# Patient Record
Sex: FEMALE | Race: BLACK OR AFRICAN AMERICAN | NOT HISPANIC OR LATINO | Employment: UNEMPLOYED | URBAN - METROPOLITAN AREA
[De-identification: names, ages, dates, MRNs, and addresses within clinical notes are randomized per-mention and may not be internally consistent; named-entity substitution may affect disease eponyms.]

---

## 2018-05-04 ENCOUNTER — HOSPITAL ENCOUNTER (OUTPATIENT)
Dept: RADIOLOGY | Facility: HOSPITAL | Age: 48
Discharge: HOME | End: 2018-05-04
Attending: FAMILY MEDICINE
Payer: MEDICAID

## 2018-05-04 VITALS — BODY MASS INDEX: 28.35 KG/M2 | HEIGHT: 63 IN | WEIGHT: 160 LBS

## 2018-05-04 DIAGNOSIS — Z12.31 SCREENING MAMMOGRAM, ENCOUNTER FOR: ICD-10-CM

## 2018-05-04 PROCEDURE — 77063 BREAST TOMOSYNTHESIS BI: CPT | Mod: 26,,, | Performed by: RADIOLOGY

## 2018-05-04 PROCEDURE — 77067 SCR MAMMO BI INCL CAD: CPT | Mod: TC,PO

## 2018-05-04 PROCEDURE — 77067 SCR MAMMO BI INCL CAD: CPT | Mod: 26,,, | Performed by: RADIOLOGY

## 2018-05-21 ENCOUNTER — HOSPITAL ENCOUNTER (OUTPATIENT)
Dept: RADIOLOGY | Facility: HOSPITAL | Age: 48
Discharge: HOME | End: 2018-05-21
Attending: FAMILY MEDICINE
Payer: MEDICAID

## 2018-05-21 DIAGNOSIS — R92.8 ABNORMAL MAMMOGRAM: ICD-10-CM

## 2018-05-21 PROCEDURE — 76642 ULTRASOUND BREAST LIMITED: CPT | Mod: 26,RT,, | Performed by: RADIOLOGY

## 2018-05-21 PROCEDURE — 76642 ULTRASOUND BREAST LIMITED: CPT | Mod: TC,PO,RT

## 2018-05-21 PROCEDURE — 77065 DX MAMMO INCL CAD UNI: CPT | Mod: 26,,, | Performed by: RADIOLOGY

## 2018-05-21 PROCEDURE — 77061 BREAST TOMOSYNTHESIS UNI: CPT | Mod: TC,PO

## 2018-05-21 PROCEDURE — 77061 BREAST TOMOSYNTHESIS UNI: CPT | Mod: 26,,, | Performed by: RADIOLOGY

## 2018-05-21 PROCEDURE — 77065 DX MAMMO INCL CAD UNI: CPT | Mod: TC,PO

## 2018-11-27 ENCOUNTER — HOSPITAL ENCOUNTER (OUTPATIENT)
Dept: RADIOLOGY | Facility: HOSPITAL | Age: 48
Discharge: HOME | End: 2018-11-27
Attending: FAMILY MEDICINE
Payer: MEDICAID

## 2018-11-27 VITALS — BODY MASS INDEX: 28.35 KG/M2 | HEIGHT: 63 IN | WEIGHT: 160 LBS

## 2018-11-27 DIAGNOSIS — R92.8 ABNORMAL MAMMOGRAM: ICD-10-CM

## 2018-11-27 PROCEDURE — 77061 BREAST TOMOSYNTHESIS UNI: CPT | Mod: TC,PO

## 2018-11-27 PROCEDURE — 77065 DX MAMMO INCL CAD UNI: CPT | Mod: 26,,, | Performed by: RADIOLOGY

## 2018-11-27 PROCEDURE — 77065 DX MAMMO INCL CAD UNI: CPT | Mod: TC,PO

## 2018-11-27 PROCEDURE — 77061 BREAST TOMOSYNTHESIS UNI: CPT | Mod: 26,,, | Performed by: RADIOLOGY

## 2019-08-26 ENCOUNTER — HOSPITAL ENCOUNTER (INPATIENT)
Facility: HOSPITAL | Age: 49
LOS: 2 days | Discharge: HOME | DRG: 638 | End: 2019-08-28
Attending: EMERGENCY MEDICINE | Admitting: INTERNAL MEDICINE
Payer: MEDICAID

## 2019-08-26 DIAGNOSIS — E11.10 DIABETIC KETOACIDOSIS WITHOUT COMA ASSOCIATED WITH TYPE 2 DIABETES MELLITUS: Primary | ICD-10-CM

## 2019-08-26 DIAGNOSIS — R73.9 HYPERGLYCEMIA: ICD-10-CM

## 2019-08-26 LAB
ALBUMIN SERPL BCP-MCNC: 4.4 G/DL (ref 3.5–5.2)
ALLENS TEST: ABNORMAL
ALP SERPL-CCNC: 87 U/L (ref 55–135)
ALT SERPL W/O P-5'-P-CCNC: 7 U/L (ref 10–44)
ANION GAP SERPL CALC-SCNC: 27 MMOL/L (ref 8–16)
AST SERPL-CCNC: 9 U/L (ref 10–40)
B-OH-BUTYR BLD STRIP-SCNC: 6.5 MMOL/L (ref 0–0.5)
BACTERIA #/AREA URNS AUTO: ABNORMAL /HPF
BASOPHILS # BLD AUTO: 0.03 K/UL (ref 0–0.2)
BASOPHILS NFR BLD: 0.3 % (ref 0–1.9)
BILIRUB SERPL-MCNC: 0.4 MG/DL (ref 0.1–1)
BILIRUB UR QL STRIP: ABNORMAL
BNP SERPL-MCNC: <10 PG/ML (ref 0–99)
BUN SERPL-MCNC: 29 MG/DL (ref 6–20)
CALCIUM SERPL-MCNC: 10.2 MG/DL (ref 8.7–10.5)
CHLORIDE SERPL-SCNC: 95 MMOL/L (ref 95–110)
CLARITY UR REFRACT.AUTO: CLEAR
CO2 SERPL-SCNC: 13 MMOL/L (ref 23–29)
COLOR UR AUTO: YELLOW
CREAT SERPL-MCNC: 1.9 MG/DL (ref 0.5–1.4)
DELSYS: ABNORMAL
DIFFERENTIAL METHOD: ABNORMAL
EOSINOPHIL # BLD AUTO: 0 K/UL (ref 0–0.5)
EOSINOPHIL NFR BLD: 0.1 % (ref 0–8)
ERYTHROCYTE [DISTWIDTH] IN BLOOD BY AUTOMATED COUNT: 15 % (ref 11.5–14.5)
EST. GFR  (AFRICAN AMERICAN): 34.9 ML/MIN/1.73 M^2
EST. GFR  (NON AFRICAN AMERICAN): 30.3 ML/MIN/1.73 M^2
GLUCOSE SERPL-MCNC: 504 MG/DL (ref 70–110)
GLUCOSE UR QL STRIP: ABNORMAL
GRAN CASTS UR QL COMP ASSIST: 2 /LPF
HCO3 UR-SCNC: 12.3 MMOL/L (ref 24–28)
HCT VFR BLD AUTO: 40.9 % (ref 37–48.5)
HGB BLD-MCNC: 13.2 G/DL (ref 12–16)
HGB UR QL STRIP: ABNORMAL
HYALINE CASTS UR QL AUTO: 5 /LPF
KETONES UR QL STRIP: ABNORMAL
LEUKOCYTE ESTERASE UR QL STRIP: NEGATIVE
LYMPHOCYTES # BLD AUTO: 1.7 K/UL (ref 1–4.8)
LYMPHOCYTES NFR BLD: 15.6 % (ref 18–48)
MCH RBC QN AUTO: 25.9 PG (ref 27–31)
MCHC RBC AUTO-ENTMCNC: 32.3 G/DL (ref 32–36)
MCV RBC AUTO: 80 FL (ref 82–98)
MICROSCOPIC COMMENT: ABNORMAL
MODE: ABNORMAL
MONOCYTES # BLD AUTO: 0.4 K/UL (ref 0.3–1)
MONOCYTES NFR BLD: 4 % (ref 4–15)
NEUTROPHILS # BLD AUTO: 8.4 K/UL (ref 1.8–7.7)
NEUTROPHILS NFR BLD: 80 % (ref 38–73)
NITRITE UR QL STRIP: NEGATIVE
PCO2 BLDA: 24.1 MMHG (ref 35–45)
PH SMN: 7.32 [PH] (ref 7.35–7.45)
PH UR STRIP: 6 [PH] (ref 5–8)
PLATELET # BLD AUTO: 277 K/UL (ref 150–350)
PMV BLD AUTO: 13.6 FL (ref 9.2–12.9)
PO2 BLDA: 101 MMHG (ref 80–100)
POC BE: -14 MMOL/L
POC SATURATED O2: 97 % (ref 95–100)
POCT GLUCOSE: 161 MG/DL (ref 70–110)
POCT GLUCOSE: 178 MG/DL (ref 70–110)
POCT GLUCOSE: 444 MG/DL (ref 70–110)
POTASSIUM SERPL-SCNC: 4.6 MMOL/L (ref 3.5–5.1)
PROT SERPL-MCNC: 8.8 G/DL (ref 6–8.4)
PROT UR QL STRIP: ABNORMAL
RBC # BLD AUTO: 5.1 M/UL (ref 4–5.4)
RBC #/AREA URNS AUTO: 2 /HPF (ref 0–4)
SAMPLE: ABNORMAL
SITE: ABNORMAL
SODIUM SERPL-SCNC: 135 MMOL/L (ref 136–145)
SP GR UR STRIP: >=1.03 (ref 1–1.03)
SQUAMOUS #/AREA URNS AUTO: 3 /HPF
TROPONIN I SERPL DL<=0.01 NG/ML-MCNC: <0.006 NG/ML (ref 0–0.03)
URN SPEC COLLECT METH UR: ABNORMAL
UROBILINOGEN UR STRIP-ACNC: NEGATIVE EU/DL
WBC # BLD AUTO: 10.56 K/UL (ref 3.9–12.7)
WBC #/AREA URNS AUTO: 4 /HPF (ref 0–5)

## 2019-08-26 PROCEDURE — 96361 HYDRATE IV INFUSION ADD-ON: CPT | Mod: ER

## 2019-08-26 PROCEDURE — 63600175 PHARM REV CODE 636 W HCPCS: Mod: ER | Performed by: EMERGENCY MEDICINE

## 2019-08-26 PROCEDURE — 93005 ELECTROCARDIOGRAM TRACING: CPT | Mod: ER

## 2019-08-26 PROCEDURE — 82010 KETONE BODYS QUAN: CPT | Mod: ER

## 2019-08-26 PROCEDURE — 20000000 HC ICU ROOM

## 2019-08-26 PROCEDURE — 82803 BLOOD GASES ANY COMBINATION: CPT | Mod: ER

## 2019-08-26 PROCEDURE — 85025 COMPLETE CBC W/AUTO DIFF WBC: CPT | Mod: ER

## 2019-08-26 PROCEDURE — 36600 WITHDRAWAL OF ARTERIAL BLOOD: CPT | Mod: ER

## 2019-08-26 PROCEDURE — 96367 TX/PROPH/DG ADDL SEQ IV INF: CPT | Mod: ER

## 2019-08-26 PROCEDURE — 93010 EKG 12-LEAD: ICD-10-PCS | Mod: ,,, | Performed by: NUCLEAR MEDICINE

## 2019-08-26 PROCEDURE — 99291 CRITICAL CARE FIRST HOUR: CPT | Mod: 25,ER

## 2019-08-26 PROCEDURE — 81000 URINALYSIS NONAUTO W/SCOPE: CPT | Mod: ER

## 2019-08-26 PROCEDURE — 96366 THER/PROPH/DIAG IV INF ADDON: CPT | Mod: ER

## 2019-08-26 PROCEDURE — 96365 THER/PROPH/DIAG IV INF INIT: CPT | Mod: ER

## 2019-08-26 PROCEDURE — 96375 TX/PRO/DX INJ NEW DRUG ADDON: CPT | Mod: ER

## 2019-08-26 PROCEDURE — 84484 ASSAY OF TROPONIN QUANT: CPT | Mod: ER

## 2019-08-26 PROCEDURE — 99900035 HC TECH TIME PER 15 MIN (STAT): Mod: ER

## 2019-08-26 PROCEDURE — 93010 ELECTROCARDIOGRAM REPORT: CPT | Mod: ,,, | Performed by: NUCLEAR MEDICINE

## 2019-08-26 PROCEDURE — 80053 COMPREHEN METABOLIC PANEL: CPT | Mod: ER

## 2019-08-26 PROCEDURE — 25000003 PHARM REV CODE 250: Mod: ER | Performed by: EMERGENCY MEDICINE

## 2019-08-26 PROCEDURE — 82962 GLUCOSE BLOOD TEST: CPT | Mod: ER

## 2019-08-26 PROCEDURE — 83880 ASSAY OF NATRIURETIC PEPTIDE: CPT | Mod: ER

## 2019-08-26 RX ORDER — ACETAMINOPHEN 500 MG
5000 TABLET ORAL DAILY
COMMUNITY

## 2019-08-26 RX ORDER — VALSARTAN 160 MG/1
160 TABLET ORAL DAILY
COMMUNITY
End: 2020-11-05

## 2019-08-26 RX ORDER — FERROUS GLUCONATE 324(38)MG
324 TABLET ORAL
COMMUNITY
End: 2020-04-30 | Stop reason: SDUPTHER

## 2019-08-26 RX ORDER — DEXTROSE MONOHYDRATE, SODIUM CHLORIDE, AND POTASSIUM CHLORIDE 50; 1.49; 4.5 G/1000ML; G/1000ML; G/1000ML
1000 INJECTION, SOLUTION INTRAVENOUS
Status: COMPLETED | OUTPATIENT
Start: 2019-08-26 | End: 2019-08-26

## 2019-08-26 RX ORDER — CARVEDILOL 6.25 MG/1
6.25 TABLET ORAL 2 TIMES DAILY WITH MEALS
COMMUNITY
End: 2020-12-08 | Stop reason: SDUPTHER

## 2019-08-26 RX ORDER — METFORMIN HYDROCHLORIDE 500 MG/1
500 TABLET ORAL 2 TIMES DAILY WITH MEALS
Status: ON HOLD | COMMUNITY
End: 2019-08-28 | Stop reason: HOSPADM

## 2019-08-26 RX ORDER — HYDROCHLOROTHIAZIDE 12.5 MG/1
12.5 TABLET ORAL DAILY
COMMUNITY
End: 2020-11-05

## 2019-08-26 RX ORDER — POTASSIUM CHLORIDE 7.45 MG/ML
10 INJECTION INTRAVENOUS
Status: COMPLETED | OUTPATIENT
Start: 2019-08-26 | End: 2019-08-26

## 2019-08-26 RX ORDER — ZIPRASIDONE HYDROCHLORIDE 60 MG/1
20 CAPSULE ORAL 2 TIMES DAILY
COMMUNITY

## 2019-08-26 RX ORDER — ATORVASTATIN CALCIUM 40 MG/1
40 TABLET, FILM COATED ORAL DAILY
COMMUNITY
End: 2020-02-20 | Stop reason: SDUPTHER

## 2019-08-26 RX ORDER — POLYETHYLENE GLYCOL 3350 17 G/17G
POWDER, FOR SOLUTION ORAL
COMMUNITY

## 2019-08-26 RX ORDER — SODIUM CHLORIDE 9 MG/ML
1000 INJECTION, SOLUTION INTRAVENOUS
Status: COMPLETED | OUTPATIENT
Start: 2019-08-26 | End: 2019-08-26

## 2019-08-26 RX ORDER — ALOGLIPTIN 25 MG/1
25 TABLET, FILM COATED ORAL
Status: ON HOLD | COMMUNITY
End: 2019-08-28 | Stop reason: HOSPADM

## 2019-08-26 RX ORDER — GLIMEPIRIDE 4 MG/1
4 TABLET ORAL
Status: ON HOLD | COMMUNITY
End: 2019-08-28 | Stop reason: HOSPADM

## 2019-08-26 RX ORDER — AMLODIPINE BESYLATE 10 MG/1
10 TABLET ORAL DAILY
COMMUNITY
End: 2020-02-20 | Stop reason: SDUPTHER

## 2019-08-26 RX ADMIN — SODIUM CHLORIDE 1000 ML: 0.9 INJECTION, SOLUTION INTRAVENOUS at 06:08

## 2019-08-26 RX ADMIN — INSULIN HUMAN 1 UNITS: 100 INJECTION, SOLUTION PARENTERAL at 06:08

## 2019-08-26 RX ADMIN — SODIUM CHLORIDE 1112 ML: 0.9 INJECTION, SOLUTION INTRAVENOUS at 06:08

## 2019-08-26 RX ADMIN — POTASSIUM CHLORIDE 10 MEQ: 7.46 INJECTION, SOLUTION INTRAVENOUS at 07:08

## 2019-08-26 RX ADMIN — SODIUM CHLORIDE 1000 ML: 0.9 INJECTION, SOLUTION INTRAVENOUS at 07:08

## 2019-08-26 RX ADMIN — DEXTROSE, SODIUM CHLORIDE, AND POTASSIUM CHLORIDE 1000 ML: 5; .45; .15 INJECTION INTRAVENOUS at 08:08

## 2019-08-26 NOTE — ED PROVIDER NOTES
Encounter Date: 8/26/2019       History     Chief Complaint   Patient presents with    Hyperglycemia     sent by PCP     The history is provided by the patient.   General Illness    The current episode started today. The problem occurs occasionally. The problem has been unchanged. The pain is at a severity of 0/10. Nothing relieves the symptoms. Nothing aggravates the symptoms. Pertinent negatives include no fever, no decreased vision, no double vision, no eye itching, no photophobia, no abdominal pain, no constipation, no diarrhea, no nausea, no vomiting, no congestion, no ear discharge, no ear pain, no headaches, no hearing loss, no mouth sores, no sore throat, no stridor, no swollen glands, no muscle aches, no neck pain, no neck stiffness, no cough, no shortness of breath, no URI, no wheezing, no rash, no discharge, no pain and no eye redness. Recently, medical care has been given by the PCP (sent by pcp today for further evaluation of hyperglycemia).     Review of patient's allergies indicates:  No Known Allergies  Past Medical History:   Diagnosis Date    Diabetes mellitus     HLD (hyperlipidemia)     Hypertension     Schizophrenia      History reviewed. No pertinent surgical history.  History reviewed. No pertinent family history.  Social History     Tobacco Use    Smoking status: Never Smoker    Smokeless tobacco: Never Used   Substance Use Topics    Alcohol use: Never     Frequency: Never    Drug use: Never     Review of Systems   Constitutional: Negative for fever.   HENT: Negative for congestion, ear discharge, ear pain, hearing loss, mouth sores and sore throat.    Eyes: Negative for double vision, photophobia, pain, discharge, redness and itching.   Respiratory: Negative for cough, shortness of breath, wheezing and stridor.    Gastrointestinal: Negative for abdominal pain, constipation, diarrhea, nausea and vomiting.   Endocrine: Positive for polydipsia and polyuria.   Musculoskeletal: Negative  for neck pain.   Skin: Negative for rash.   Neurological: Negative for headaches.   All other systems reviewed and are negative.      Physical Exam     Initial Vitals [08/26/19 1727]   BP Pulse Resp Temp SpO2   130/82 88 20 98.5 °F (36.9 °C) 100 %      MAP       --         Physical Exam    Nursing note and vitals reviewed.  Constitutional: She appears well-developed and well-nourished.   HENT:   Head: Normocephalic and atraumatic.   Mouth/Throat: No oropharyngeal exudate.   Eyes: Conjunctivae and EOM are normal. Pupils are equal, round, and reactive to light.   Neck: Normal range of motion. Neck supple. No thyromegaly present.   Cardiovascular: Normal rate, regular rhythm, normal heart sounds and intact distal pulses. Exam reveals no gallop and no friction rub.    No murmur heard.  Pulmonary/Chest: Effort normal and breath sounds normal. No respiratory distress. She has no decreased breath sounds. She has no wheezes. She has no rhonchi. She exhibits no tenderness.   Abdominal: Soft. Bowel sounds are normal. She exhibits no distension. There is no tenderness. There is no rebound and no guarding.   Musculoskeletal: Normal range of motion. She exhibits no edema or tenderness.   Lymphadenopathy:     She has no cervical adenopathy.   Neurological: She is alert and oriented to person, place, and time. She has normal strength. No cranial nerve deficit or sensory deficit.   Skin: Skin is warm and dry. No rash noted.   Psychiatric: She has a normal mood and affect. Her behavior is normal. Judgment and thought content normal.         ED Course   Critical Care  Date/Time: 8/26/2019 6:41 PM  Performed by: Josh Dias MD  Authorized by: Josh Dias MD   Direct patient critical care time: 10 minutes  Additional history critical care time: 5 minutes  Ordering / reviewing critical care time: 10 minutes  Documentation critical care time: 5 minutes  Consulting other physicians critical care time: 5 minutes  Total critical  care time (exclusive of procedural time) : 35 minutes  Critical care time was exclusive of separately billable procedures and treating other patients and teaching time.  Critical care was necessary to treat or prevent imminent or life-threatening deterioration of the following conditions: DKA.  Critical care was time spent personally by me on the following activities: blood draw for specimens, development of treatment plan with patient or surrogate, discussions with consultants, interpretation of cardiac output measurements, evaluation of patient's response to treatment, examination of patient, obtaining history from patient or surrogate, ordering and performing treatments and interventions, ordering and review of laboratory studies, ordering and review of radiographic studies, pulse oximetry, re-evaluation of patient's condition and review of old charts.        Labs Reviewed   CBC W/ AUTO DIFFERENTIAL - Abnormal; Notable for the following components:       Result Value    Mean Corpuscular Volume 80 (*)     Mean Corpuscular Hemoglobin 25.9 (*)     RDW 15.0 (*)     MPV 13.6 (*)     Gran # (ANC) 8.4 (*)     Gran% 80.0 (*)     Lymph% 15.6 (*)     All other components within normal limits   COMPREHENSIVE METABOLIC PANEL - Abnormal; Notable for the following components:    Sodium 135 (*)     CO2 13 (*)     Glucose 504 (*)     BUN, Bld 29 (*)     Creatinine 1.9 (*)     Total Protein 8.8 (*)     AST 9 (*)     ALT 7 (*)     Anion Gap 27 (*)     eGFR if  34.9 (*)     eGFR if non  30.3 (*)     All other components within normal limits    Narrative:        Glucose critical result(s) called and verbal readback obtained   from Chandan Aden, 08/26/2019 18:17   BETA - HYDROXYBUTYRATE, SERUM - Abnormal; Notable for the following components:    Beta-Hydroxybutyrate 6.5 (*)     All other components within normal limits   POCT GLUCOSE - Abnormal; Notable for the following components:    POCT Glucose  444 (*)     All other components within normal limits   ISTAT PROCEDURE - Abnormal; Notable for the following components:    POC PH 7.316 (*)     POC PCO2 24.1 (*)     POC PO2 101 (*)     POC HCO3 12.3 (*)     All other components within normal limits   TROPONIN I   B-TYPE NATRIURETIC PEPTIDE   URINALYSIS, REFLEX TO URINE CULTURE   POCT GLUCOSE MONITORING CONTINUOUS     Results for orders placed or performed during the hospital encounter of 08/26/19   CBC auto differential   Result Value Ref Range    WBC 10.56 3.90 - 12.70 K/uL    RBC 5.10 4.00 - 5.40 M/uL    Hemoglobin 13.2 12.0 - 16.0 g/dL    Hematocrit 40.9 37.0 - 48.5 %    Mean Corpuscular Volume 80 (L) 82 - 98 fL    Mean Corpuscular Hemoglobin 25.9 (L) 27.0 - 31.0 pg    Mean Corpuscular Hemoglobin Conc 32.3 32.0 - 36.0 g/dL    RDW 15.0 (H) 11.5 - 14.5 %    Platelets 277 150 - 350 K/uL    MPV 13.6 (H) 9.2 - 12.9 fL    Gran # (ANC) 8.4 (H) 1.8 - 7.7 K/uL    Lymph # 1.7 1.0 - 4.8 K/uL    Mono # 0.4 0.3 - 1.0 K/uL    Eos # 0.0 0.0 - 0.5 K/uL    Baso # 0.03 0.00 - 0.20 K/uL    Gran% 80.0 (H) 38.0 - 73.0 %    Lymph% 15.6 (L) 18.0 - 48.0 %    Mono% 4.0 4.0 - 15.0 %    Eosinophil% 0.1 0.0 - 8.0 %    Basophil% 0.3 0.0 - 1.9 %    Differential Method Automated    Comprehensive metabolic panel   Result Value Ref Range    Sodium 135 (L) 136 - 145 mmol/L    Potassium 4.6 3.5 - 5.1 mmol/L    Chloride 95 95 - 110 mmol/L    CO2 13 (L) 23 - 29 mmol/L    Glucose 504 (HH) 70 - 110 mg/dL    BUN, Bld 29 (H) 6 - 20 mg/dL    Creatinine 1.9 (H) 0.5 - 1.4 mg/dL    Calcium 10.2 8.7 - 10.5 mg/dL    Total Protein 8.8 (H) 6.0 - 8.4 g/dL    Albumin 4.4 3.5 - 5.2 g/dL    Total Bilirubin 0.4 0.1 - 1.0 mg/dL    Alkaline Phosphatase 87 55 - 135 U/L    AST 9 (L) 10 - 40 U/L    ALT 7 (L) 10 - 44 U/L    Anion Gap 27 (H) 8 - 16 mmol/L    eGFR if African American 34.9 (A) >60 mL/min/1.73 m^2    eGFR if non  30.3 (A) >60 mL/min/1.73 m^2   Troponin I   Result Value Ref Range    Troponin  I <0.006 0.000 - 0.026 ng/mL   Brain natriuretic peptide   Result Value Ref Range    BNP <10 0 - 99 pg/mL   Beta - Hydroxybutyrate, Serum   Result Value Ref Range    Beta-Hydroxybutyrate 6.5 (H) 0.0 - 0.5 mmol/L   POCT glucose   Result Value Ref Range    POCT Glucose 444 (H) 70 - 110 mg/dL   ISTAT PROCEDURE   Result Value Ref Range    POC PH 7.316 (L) 7.35 - 7.45    POC PCO2 24.1 (LL) 35 - 45 mmHg    POC PO2 101 (H) 80 - 100 mmHg    POC HCO3 12.3 (L) 24 - 28 mmol/L    POC BE -14 -2 to 2 mmol/L    POC SATURATED O2 97 95 - 100 %    Sample ARTERIAL     Site RB     Allens Test N/A     DelSys Room Air     Mode AVAPS          EKG Readings: (Independently Interpreted)   Initial Reading: No STEMI. Rhythm: Normal Sinus Rhythm. Heart Rate: 98. Ectopy: No Ectopy. Conduction: Normal. ST Segments: Normal ST Segments. T Waves: Normal. Axis: Normal. Clinical Impression: Normal Sinus Rhythm       Imaging Results          X-Ray Chest AP Portable (Final result)  Result time 08/26/19 18:12:04    Final result by DANA Ceballos Sr., MD (08/26/19 18:12:04)                 Impression:      Normal study.      Electronically signed by: Sonu Ceballos MD  Date:    08/26/2019  Time:    18:12             Narrative:    EXAMINATION:  XR CHEST AP PORTABLE    CLINICAL HISTORY:  hyperglycemia;    COMPARISON:  None    FINDINGS:  The size of the heart is normal. The lungs are clear. There is no pneumothorax.  The costophrenic angles are sharp.                                 Medical Decision Making:   Patient reports to the emergency department with complaints of generalized weakness with nausea/vomiting for the past 2-3 days.  Patient admits to medication noncompliance during that time.  Patient was initially seen by primary care physician and noted to be hyperglycemic.  She was given 14 units of insulin and sent to the emergency department.  Here in the emergency department, patient appears to be in DKA.  Patient was bolused 2 L of IV fluids and  "started on insulin drip.  Insulin bolus was not administered due to patient just receiving insulin just prior to arrival.  Patient will need transfer for diagnosis of DKA since we do not have ICU at this facility.  Patient voiced understanding of the plan of care including the need for transfer.  Patient was offered at [facility], and patient accepted.  Dr. Do is the accepting physician.  Patient will be transferred via [facility] with insulin drip and IV fluids en route       Vitals:    08/26/19 1727 08/26/19 1740   BP: 130/82    Pulse: 88 100   Resp: 20    Temp: 98.5 °F (36.9 °C)    TempSrc: Oral    SpO2: 100%    Weight: 55.6 kg (122 lb 7.5 oz)    Height: 4' 11" (1.499 m)              Imaging Results          X-Ray Chest AP Portable (Final result)  Result time 08/26/19 18:12:04    Final result by DANA Ceballos Sr., MD (08/26/19 18:12:04)                 Impression:      Normal study.      Electronically signed by: Sonu Ceballos MD  Date:    08/26/2019  Time:    18:12             Narrative:    EXAMINATION:  XR CHEST AP PORTABLE    CLINICAL HISTORY:  hyperglycemia;    COMPARISON:  None    FINDINGS:  The size of the heart is normal. The lungs are clear. There is no pneumothorax.  The costophrenic angles are sharp.                                Medications   insulin regular (Humulin R) 100 Units in sodium chloride 0.9% 100 mL infusion (has no administration in time range)   sodium chloride 0.9% bolus 1,000 mL (has no administration in time range)   sodium chloride 0.9% bolus 1,112 mL (1,112 mLs Intravenous New Bag 8/26/19 1802)       6:40 PM:  Patient care taken over by Dr. Dias.  Labs appear to be representative of DKA.  Patient will be treated as such and transferred.           Current Discharge Medication List            ED Diagnosis  1. Diabetic ketoacidosis without coma associated with type 2 diabetes mellitus    2. Hyperglycemia                          Clinical Impression:       " ICD-10-CM ICD-9-CM   1. Diabetic ketoacidosis without coma associated with type 2 diabetes mellitus E11.10 250.12   2. Hyperglycemia R73.9 790.29         Disposition:   Disposition: Transferred  Condition: Fair                        Josh Dias MD  08/26/19 1902       Josh Dias MD  08/27/19 0204

## 2019-08-27 PROBLEM — R82.81 BACTERIURIA WITH PYURIA: Status: ACTIVE | Noted: 2019-08-27

## 2019-08-27 PROBLEM — N17.9 AKI (ACUTE KIDNEY INJURY): Status: ACTIVE | Noted: 2019-08-27

## 2019-08-27 PROBLEM — I10 ESSENTIAL HYPERTENSION: Status: ACTIVE | Noted: 2019-08-27

## 2019-08-27 PROBLEM — R82.71 BACTERIURIA WITH PYURIA: Status: ACTIVE | Noted: 2019-08-27

## 2019-08-27 PROBLEM — E43 SEVERE MALNUTRITION: Status: ACTIVE | Noted: 2019-08-27

## 2019-08-27 PROBLEM — E78.49 OTHER HYPERLIPIDEMIA: Status: ACTIVE | Noted: 2019-08-27

## 2019-08-27 LAB
ANION GAP SERPL CALC-SCNC: 11 MMOL/L (ref 8–16)
ANION GAP SERPL CALC-SCNC: 15 MMOL/L (ref 8–16)
ANION GAP SERPL CALC-SCNC: 15 MMOL/L (ref 8–16)
ANION GAP SERPL CALC-SCNC: 8 MMOL/L (ref 8–16)
ANION GAP SERPL CALC-SCNC: 8 MMOL/L (ref 8–16)
BUN SERPL-MCNC: 13 MG/DL (ref 6–20)
BUN SERPL-MCNC: 14 MG/DL (ref 6–20)
BUN SERPL-MCNC: 17 MG/DL (ref 6–20)
BUN SERPL-MCNC: 17 MG/DL (ref 6–20)
BUN SERPL-MCNC: 9 MG/DL (ref 6–20)
CALCIUM SERPL-MCNC: 8.1 MG/DL (ref 8.7–10.5)
CALCIUM SERPL-MCNC: 8.3 MG/DL (ref 8.7–10.5)
CALCIUM SERPL-MCNC: 8.4 MG/DL (ref 8.7–10.5)
CALCIUM SERPL-MCNC: 8.6 MG/DL (ref 8.7–10.5)
CALCIUM SERPL-MCNC: 8.6 MG/DL (ref 8.7–10.5)
CHLORIDE SERPL-SCNC: 105 MMOL/L (ref 95–110)
CHLORIDE SERPL-SCNC: 107 MMOL/L (ref 95–110)
CHLORIDE SERPL-SCNC: 107 MMOL/L (ref 95–110)
CHLORIDE SERPL-SCNC: 109 MMOL/L (ref 95–110)
CHLORIDE SERPL-SCNC: 110 MMOL/L (ref 95–110)
CHOLEST SERPL-MCNC: 265 MG/DL (ref 120–199)
CHOLEST/HDLC SERPL: 8.8 {RATIO} (ref 2–5)
CO2 SERPL-SCNC: 17 MMOL/L (ref 23–29)
CO2 SERPL-SCNC: 17 MMOL/L (ref 23–29)
CO2 SERPL-SCNC: 18 MMOL/L (ref 23–29)
CO2 SERPL-SCNC: 20 MMOL/L (ref 23–29)
CO2 SERPL-SCNC: 21 MMOL/L (ref 23–29)
CREAT SERPL-MCNC: 0.9 MG/DL (ref 0.5–1.4)
CREAT SERPL-MCNC: 1 MG/DL (ref 0.5–1.4)
CREAT SERPL-MCNC: 1 MG/DL (ref 0.5–1.4)
CREAT SERPL-MCNC: 1.1 MG/DL (ref 0.5–1.4)
CREAT SERPL-MCNC: 1.1 MG/DL (ref 0.5–1.4)
EST. GFR  (AFRICAN AMERICAN): >60 ML/MIN/1.73 M^2
EST. GFR  (NON AFRICAN AMERICAN): 59 ML/MIN/1.73 M^2
EST. GFR  (NON AFRICAN AMERICAN): 59 ML/MIN/1.73 M^2
EST. GFR  (NON AFRICAN AMERICAN): >60 ML/MIN/1.73 M^2
ESTIMATED AVG GLUCOSE: ABNORMAL MG/DL (ref 68–131)
GLUCOSE SERPL-MCNC: 187 MG/DL (ref 70–110)
GLUCOSE SERPL-MCNC: 187 MG/DL (ref 70–110)
GLUCOSE SERPL-MCNC: 218 MG/DL (ref 70–110)
GLUCOSE SERPL-MCNC: 236 MG/DL (ref 70–110)
GLUCOSE SERPL-MCNC: 341 MG/DL (ref 70–110)
HBA1C MFR BLD HPLC: >14 % (ref 4–5.6)
HDLC SERPL-MCNC: 30 MG/DL (ref 40–75)
HDLC SERPL: 11.3 % (ref 20–50)
LACTATE SERPL-SCNC: 1.7 MMOL/L (ref 0.5–2.2)
LDLC SERPL CALC-MCNC: 195.4 MG/DL (ref 63–159)
MAGNESIUM SERPL-MCNC: 2.1 MG/DL (ref 1.6–2.6)
MAGNESIUM SERPL-MCNC: 3.1 MG/DL (ref 1.6–2.6)
NONHDLC SERPL-MCNC: 235 MG/DL
PHOSPHATE SERPL-MCNC: 1.2 MG/DL (ref 2.7–4.5)
PHOSPHATE SERPL-MCNC: 1.5 MG/DL (ref 2.7–4.5)
PHOSPHATE SERPL-MCNC: 1.7 MG/DL (ref 2.7–4.5)
POCT GLUCOSE: 169 MG/DL (ref 70–110)
POCT GLUCOSE: 193 MG/DL (ref 70–110)
POCT GLUCOSE: 210 MG/DL (ref 70–110)
POCT GLUCOSE: 214 MG/DL (ref 70–110)
POCT GLUCOSE: 218 MG/DL (ref 70–110)
POCT GLUCOSE: 222 MG/DL (ref 70–110)
POCT GLUCOSE: 228 MG/DL (ref 70–110)
POCT GLUCOSE: 235 MG/DL (ref 70–110)
POCT GLUCOSE: 240 MG/DL (ref 70–110)
POCT GLUCOSE: 255 MG/DL (ref 70–110)
POCT GLUCOSE: 256 MG/DL (ref 70–110)
POCT GLUCOSE: 273 MG/DL (ref 70–110)
POCT GLUCOSE: 310 MG/DL (ref 70–110)
POTASSIUM SERPL-SCNC: 4 MMOL/L (ref 3.5–5.1)
POTASSIUM SERPL-SCNC: 4.2 MMOL/L (ref 3.5–5.1)
POTASSIUM SERPL-SCNC: 4.2 MMOL/L (ref 3.5–5.1)
POTASSIUM SERPL-SCNC: 4.4 MMOL/L (ref 3.5–5.1)
POTASSIUM SERPL-SCNC: 4.9 MMOL/L (ref 3.5–5.1)
SODIUM SERPL-SCNC: 134 MMOL/L (ref 136–145)
SODIUM SERPL-SCNC: 138 MMOL/L (ref 136–145)
SODIUM SERPL-SCNC: 138 MMOL/L (ref 136–145)
SODIUM SERPL-SCNC: 139 MMOL/L (ref 136–145)
SODIUM SERPL-SCNC: 139 MMOL/L (ref 136–145)
TRIGL SERPL-MCNC: 198 MG/DL (ref 30–150)
TROPONIN I SERPL DL<=0.01 NG/ML-MCNC: 0.01 NG/ML (ref 0–0.03)
TROPONIN I SERPL DL<=0.01 NG/ML-MCNC: 0.01 NG/ML (ref 0–0.03)
TROPONIN I SERPL DL<=0.01 NG/ML-MCNC: <0.006 NG/ML (ref 0–0.03)
TSH SERPL DL<=0.005 MIU/L-ACNC: 0.56 UIU/ML (ref 0.4–4)

## 2019-08-27 PROCEDURE — 83605 ASSAY OF LACTIC ACID: CPT

## 2019-08-27 PROCEDURE — 25000003 PHARM REV CODE 250: Performed by: INTERNAL MEDICINE

## 2019-08-27 PROCEDURE — 83036 HEMOGLOBIN GLYCOSYLATED A1C: CPT

## 2019-08-27 PROCEDURE — 80048 BASIC METABOLIC PNL TOTAL CA: CPT | Mod: 91

## 2019-08-27 PROCEDURE — S5571 INSULIN DISPOS PEN 3 ML: HCPCS | Performed by: INTERNAL MEDICINE

## 2019-08-27 PROCEDURE — 80061 LIPID PANEL: CPT

## 2019-08-27 PROCEDURE — 99291 CRITICAL CARE FIRST HOUR: CPT | Mod: ,,, | Performed by: INTERNAL MEDICINE

## 2019-08-27 PROCEDURE — 63600175 PHARM REV CODE 636 W HCPCS: Performed by: INTERNAL MEDICINE

## 2019-08-27 PROCEDURE — 99291 PR CRITICAL CARE, E/M 30-74 MINUTES: ICD-10-PCS | Mod: ,,, | Performed by: INTERNAL MEDICINE

## 2019-08-27 PROCEDURE — 36415 COLL VENOUS BLD VENIPUNCTURE: CPT

## 2019-08-27 PROCEDURE — 84484 ASSAY OF TROPONIN QUANT: CPT

## 2019-08-27 PROCEDURE — 83735 ASSAY OF MAGNESIUM: CPT

## 2019-08-27 PROCEDURE — 84443 ASSAY THYROID STIM HORMONE: CPT

## 2019-08-27 PROCEDURE — 84100 ASSAY OF PHOSPHORUS: CPT | Mod: 91

## 2019-08-27 PROCEDURE — 21400001 HC TELEMETRY ROOM

## 2019-08-27 PROCEDURE — S5010 5% DEXTROSE AND 0.45% SALINE: HCPCS | Performed by: INTERNAL MEDICINE

## 2019-08-27 PROCEDURE — 97802 MEDICAL NUTRITION INDIV IN: CPT

## 2019-08-27 RX ORDER — SODIUM CHLORIDE 9 MG/ML
INJECTION, SOLUTION INTRAVENOUS CONTINUOUS
Status: DISCONTINUED | OUTPATIENT
Start: 2019-08-27 | End: 2019-08-27

## 2019-08-27 RX ORDER — POTASSIUM CHLORIDE 20 MEQ/1
40 TABLET, EXTENDED RELEASE ORAL ONCE
Status: DISCONTINUED | OUTPATIENT
Start: 2019-08-27 | End: 2019-08-28 | Stop reason: HOSPADM

## 2019-08-27 RX ORDER — POTASSIUM CHLORIDE 29.8 MG/ML
40 INJECTION INTRAVENOUS
Status: DISCONTINUED | OUTPATIENT
Start: 2019-08-27 | End: 2019-08-27

## 2019-08-27 RX ORDER — ACETAMINOPHEN 325 MG/1
650 TABLET ORAL EVERY 8 HOURS PRN
Status: DISCONTINUED | OUTPATIENT
Start: 2019-08-27 | End: 2019-08-28 | Stop reason: HOSPADM

## 2019-08-27 RX ORDER — ATORVASTATIN CALCIUM 40 MG/1
40 TABLET, FILM COATED ORAL DAILY
Status: DISCONTINUED | OUTPATIENT
Start: 2019-08-27 | End: 2019-08-28 | Stop reason: HOSPADM

## 2019-08-27 RX ORDER — IBUPROFEN 200 MG
24 TABLET ORAL
Status: DISCONTINUED | OUTPATIENT
Start: 2019-08-27 | End: 2019-08-28 | Stop reason: HOSPADM

## 2019-08-27 RX ORDER — LOSARTAN POTASSIUM 50 MG/1
50 TABLET ORAL DAILY
Status: DISCONTINUED | OUTPATIENT
Start: 2019-08-27 | End: 2019-08-28 | Stop reason: HOSPADM

## 2019-08-27 RX ORDER — POTASSIUM CHLORIDE 20 MEQ/15ML
40 SOLUTION ORAL ONCE
Status: COMPLETED | OUTPATIENT
Start: 2019-08-27 | End: 2019-08-27

## 2019-08-27 RX ORDER — ZIPRASIDONE HYDROCHLORIDE 20 MG/1
20 CAPSULE ORAL 2 TIMES DAILY
Status: DISCONTINUED | OUTPATIENT
Start: 2019-08-27 | End: 2019-08-28 | Stop reason: HOSPADM

## 2019-08-27 RX ORDER — RAMELTEON 8 MG/1
8 TABLET ORAL NIGHTLY PRN
Status: DISCONTINUED | OUTPATIENT
Start: 2019-08-27 | End: 2019-08-28 | Stop reason: HOSPADM

## 2019-08-27 RX ORDER — POTASSIUM CHLORIDE 20 MEQ/1
40 TABLET, EXTENDED RELEASE ORAL ONCE
Status: COMPLETED | OUTPATIENT
Start: 2019-08-27 | End: 2019-08-27

## 2019-08-27 RX ORDER — HYDROCODONE BITARTRATE AND ACETAMINOPHEN 5; 325 MG/1; MG/1
1 TABLET ORAL EVERY 8 HOURS PRN
Status: DISCONTINUED | OUTPATIENT
Start: 2019-08-27 | End: 2019-08-28 | Stop reason: HOSPADM

## 2019-08-27 RX ORDER — ACETAMINOPHEN 500 MG
5000 TABLET ORAL DAILY
Status: DISCONTINUED | OUTPATIENT
Start: 2019-08-27 | End: 2019-08-28 | Stop reason: HOSPADM

## 2019-08-27 RX ORDER — ONDANSETRON 2 MG/ML
4 INJECTION INTRAMUSCULAR; INTRAVENOUS EVERY 6 HOURS PRN
Status: DISCONTINUED | OUTPATIENT
Start: 2019-08-27 | End: 2019-08-28 | Stop reason: HOSPADM

## 2019-08-27 RX ORDER — AMLODIPINE BESYLATE 10 MG/1
10 TABLET ORAL DAILY
Status: DISCONTINUED | OUTPATIENT
Start: 2019-08-27 | End: 2019-08-28 | Stop reason: HOSPADM

## 2019-08-27 RX ORDER — HYDROCHLOROTHIAZIDE 12.5 MG/1
12.5 TABLET ORAL DAILY
Status: DISCONTINUED | OUTPATIENT
Start: 2019-08-27 | End: 2019-08-27

## 2019-08-27 RX ORDER — PANTOPRAZOLE SODIUM 40 MG/1
40 TABLET, DELAYED RELEASE ORAL DAILY
Status: DISCONTINUED | OUTPATIENT
Start: 2019-08-27 | End: 2019-08-28 | Stop reason: HOSPADM

## 2019-08-27 RX ORDER — SODIUM,POTASSIUM PHOSPHATES 280-250MG
1 POWDER IN PACKET (EA) ORAL
Status: DISCONTINUED | OUTPATIENT
Start: 2019-08-27 | End: 2019-08-28 | Stop reason: HOSPADM

## 2019-08-27 RX ORDER — MAGNESIUM SULFATE HEPTAHYDRATE 40 MG/ML
2 INJECTION, SOLUTION INTRAVENOUS ONCE
Status: COMPLETED | OUTPATIENT
Start: 2019-08-27 | End: 2019-08-27

## 2019-08-27 RX ORDER — BISACODYL 10 MG
10 SUPPOSITORY, RECTAL RECTAL DAILY PRN
Status: DISCONTINUED | OUTPATIENT
Start: 2019-08-27 | End: 2019-08-28 | Stop reason: HOSPADM

## 2019-08-27 RX ORDER — POTASSIUM CHLORIDE 7.45 MG/ML
80 INJECTION INTRAVENOUS
Status: DISCONTINUED | OUTPATIENT
Start: 2019-08-27 | End: 2019-08-27

## 2019-08-27 RX ORDER — FERROUS GLUCONATE 324(38)MG
324 TABLET ORAL
Status: DISCONTINUED | OUTPATIENT
Start: 2019-08-27 | End: 2019-08-28 | Stop reason: HOSPADM

## 2019-08-27 RX ORDER — INSULIN ASPART 100 [IU]/ML
0-5 INJECTION, SOLUTION INTRAVENOUS; SUBCUTANEOUS
Status: DISCONTINUED | OUTPATIENT
Start: 2019-08-27 | End: 2019-08-28 | Stop reason: HOSPADM

## 2019-08-27 RX ORDER — ZIPRASIDONE HYDROCHLORIDE 20 MG/1
20 CAPSULE ORAL 2 TIMES DAILY
Status: DISCONTINUED | OUTPATIENT
Start: 2019-08-27 | End: 2019-08-27

## 2019-08-27 RX ORDER — POTASSIUM CHLORIDE 7.45 MG/ML
40 INJECTION INTRAVENOUS
Status: DISCONTINUED | OUTPATIENT
Start: 2019-08-27 | End: 2019-08-27

## 2019-08-27 RX ORDER — POLYETHYLENE GLYCOL 3350 17 G/17G
17 POWDER, FOR SOLUTION ORAL DAILY
Status: DISCONTINUED | OUTPATIENT
Start: 2019-08-27 | End: 2019-08-28 | Stop reason: HOSPADM

## 2019-08-27 RX ORDER — IBUPROFEN 200 MG
16 TABLET ORAL
Status: DISCONTINUED | OUTPATIENT
Start: 2019-08-27 | End: 2019-08-28 | Stop reason: HOSPADM

## 2019-08-27 RX ORDER — CARVEDILOL 6.25 MG/1
6.25 TABLET ORAL 2 TIMES DAILY WITH MEALS
Status: DISCONTINUED | OUTPATIENT
Start: 2019-08-27 | End: 2019-08-28 | Stop reason: HOSPADM

## 2019-08-27 RX ORDER — GLUCAGON 1 MG
1 KIT INJECTION
Status: DISCONTINUED | OUTPATIENT
Start: 2019-08-27 | End: 2019-08-28 | Stop reason: HOSPADM

## 2019-08-27 RX ORDER — SODIUM CHLORIDE 0.9 % (FLUSH) 0.9 %
10 SYRINGE (ML) INJECTION
Status: DISCONTINUED | OUTPATIENT
Start: 2019-08-27 | End: 2019-08-28 | Stop reason: HOSPADM

## 2019-08-27 RX ORDER — DEXTROSE MONOHYDRATE AND SODIUM CHLORIDE 5; .45 G/100ML; G/100ML
INJECTION, SOLUTION INTRAVENOUS CONTINUOUS PRN
Status: DISCONTINUED | OUTPATIENT
Start: 2019-08-27 | End: 2019-08-27

## 2019-08-27 RX ORDER — HEPARIN SODIUM 5000 [USP'U]/ML
5000 INJECTION, SOLUTION INTRAVENOUS; SUBCUTANEOUS EVERY 12 HOURS
Status: DISCONTINUED | OUTPATIENT
Start: 2019-08-27 | End: 2019-08-28 | Stop reason: HOSPADM

## 2019-08-27 RX ADMIN — FERROUS GLUCONATE TAB 324 MG (37.5 MG ELEMENTAL IRON) 324 MG: 324 (37.5 FE) TAB at 07:08

## 2019-08-27 RX ADMIN — HEPARIN SODIUM 5000 UNITS: 5000 INJECTION, SOLUTION INTRAVENOUS; SUBCUTANEOUS at 09:08

## 2019-08-27 RX ADMIN — POTASSIUM CHLORIDE 40 MEQ: 20 SOLUTION ORAL at 08:08

## 2019-08-27 RX ADMIN — SODIUM CHLORIDE 1.25 UNITS/HR: 9 INJECTION, SOLUTION INTRAVENOUS at 02:08

## 2019-08-27 RX ADMIN — CHOLECALCIFEROL TAB 125 MCG (5000 UNIT) 5000 UNITS: 125 TAB at 08:08

## 2019-08-27 RX ADMIN — MAGNESIUM SULFATE IN WATER 2 G: 40 INJECTION, SOLUTION INTRAVENOUS at 02:08

## 2019-08-27 RX ADMIN — ZIPRASIDONE HYDROCHLORIDE 20 MG: 20 CAPSULE ORAL at 09:08

## 2019-08-27 RX ADMIN — LOSARTAN POTASSIUM 50 MG: 50 TABLET, FILM COATED ORAL at 08:08

## 2019-08-27 RX ADMIN — ATORVASTATIN CALCIUM 40 MG: 40 TABLET, FILM COATED ORAL at 08:08

## 2019-08-27 RX ADMIN — POTASSIUM & SODIUM PHOSPHATES POWDER PACK 280-160-250 MG 1 PACKET: 280-160-250 PACK at 11:08

## 2019-08-27 RX ADMIN — CARVEDILOL 6.25 MG: 6.25 TABLET, FILM COATED ORAL at 05:08

## 2019-08-27 RX ADMIN — INSULIN ASPART 3 UNITS: 100 INJECTION, SOLUTION INTRAVENOUS; SUBCUTANEOUS at 12:08

## 2019-08-27 RX ADMIN — DEXTROSE AND SODIUM CHLORIDE: 5; .45 INJECTION, SOLUTION INTRAVENOUS at 02:08

## 2019-08-27 RX ADMIN — ZIPRASIDONE HYDROCHLORIDE 20 MG: 20 CAPSULE ORAL at 08:08

## 2019-08-27 RX ADMIN — POTASSIUM & SODIUM PHOSPHATES POWDER PACK 280-160-250 MG 1 PACKET: 280-160-250 PACK at 09:08

## 2019-08-27 RX ADMIN — HEPARIN SODIUM 5000 UNITS: 5000 INJECTION, SOLUTION INTRAVENOUS; SUBCUTANEOUS at 08:08

## 2019-08-27 RX ADMIN — AMLODIPINE BESYLATE 10 MG: 10 TABLET ORAL at 08:08

## 2019-08-27 RX ADMIN — HYDROCHLOROTHIAZIDE 12.5 MG: 12.5 TABLET ORAL at 08:08

## 2019-08-27 RX ADMIN — CARVEDILOL 6.25 MG: 6.25 TABLET, FILM COATED ORAL at 07:08

## 2019-08-27 RX ADMIN — POTASSIUM & SODIUM PHOSPHATES POWDER PACK 280-160-250 MG 1 PACKET: 280-160-250 PACK at 05:08

## 2019-08-27 RX ADMIN — INSULIN ASPART 3 UNITS: 100 INJECTION, SOLUTION INTRAVENOUS; SUBCUTANEOUS at 04:08

## 2019-08-27 RX ADMIN — INSULIN DETEMIR 10 UNITS: 100 INJECTION, SOLUTION SUBCUTANEOUS at 10:08

## 2019-08-27 RX ADMIN — POTASSIUM CHLORIDE 40 MEQ: 1500 TABLET, EXTENDED RELEASE ORAL at 02:08

## 2019-08-27 NOTE — ASSESSMENT & PLAN NOTE
Malnutrition in the context of Chronic Illness/Injury    Related to (etiology):  Inadequate energy intake     Signs and Symptoms (as evidenced by):  Energy Intake: <50% of estimated energy requirement for > 4 days   Body Fat Depletion: mild depletion of triceps and thoracic and lumbar region   Muscle Mass Depletion: mild depletion of temples, clavicle region, scapular region and interosseous muscle   Weight Loss:23.75 % within the last 9 months   Fluid Accumulation: moderate    Interventions/Recommendations (treatment strategy):  See above     Nutrition Diagnosis Status:  New

## 2019-08-27 NOTE — PROGRESS NOTES
"  [facility] -   Adult Nutrition  Consult Note    SUMMARY     Recommendations    Recommendation: 1. Continue current diet & ONS. 2. RD to f/u.  Intervention: Coordination of care   Goals: Meet > 85 % EEN/EPN while admitted   Nutrition Goal Status: new  Communication of RD Recs: reviewed with RN    Reason for Assessment    Reason For Assessment: consult   Dx: Diabetic ketoacidosis without coma   Hx: DM , HLD, HTN  General info comments: Pt currently in ICU. Pt reports wt loss of 17 lbs, unsure of time frame x wt loss. Per epic records, pt weighed 160 lbs on 11/27/18, current wt= 122 lbs ( wt loss of 38 lbs within the last 9 months). Pt reports poor appetite and intake PTA ( PO intake < 50 % x 3 days). Per ICU rounds, pt w/ poor intake + N/V x 2 weeks d/t recent dental work (teeth/tooth missing). NFPE completed 8/27/19, mild subcutaneous fat and muscle loss.  Pt was educated on diabetic diet. Reviewed nutrition recs w/ RN this am. Diet & ONS  ordered per RD recs.  Nutrition Discharge planning: diabetic cardiac diet     Nutrition Risk Screen    Nutrition Risk Screen: no indicators present    Nutrition/Diet History    Spiritual, Cultural Beliefs, Islam Practices, Values that Affect Care: no    Anthropometrics    Temp: 98.3 °F (36.8 °C)  Height Method: Stated  Height: 4' 11" (149.9 cm)  Height (inches): 59 in  Weight Method: Standard Scale  Weight: 55.6 kg (122 lb 7.5 oz)  Weight (lb): 122.47 lb  Ideal Body Weight (IBW), Female: 95 lb  % Ideal Body Weight, Female (lb): 128.92 lb  BMI (Calculated): 24.8  BMI Grade: 18.5-24.9 - normal       Lab/Procedures/Meds    Pertinent Labs Reviewed: reviewed  BMP  Lab Results   Component Value Date     08/27/2019    K 4.0 08/27/2019     08/27/2019    CO2 18 (L) 08/27/2019    BUN 13 08/27/2019    CREATININE 0.9 08/27/2019    CALCIUM 8.3 (L) 08/27/2019    ANIONGAP 11 08/27/2019    ESTGFRAFRICA >60 08/27/2019    EGFRNONAA >60 08/27/2019     Lab Results "   Component Value Date    CALCIUM 8.3 (L) 08/27/2019    PHOS 1.2 (L) 08/27/2019     Lab Results   Component Value Date    ALBUMIN 4.4 08/26/2019     Recent Labs   Lab 08/27/19  1007   POCTGLUCOSE 310*     No results found for: LABA1C, HGBA1C    Pertinent Medications Reviewed: reviewed      Estimated/Assessed Needs    Weight Used For Calorie Calculations: 55.6 kg (122 lb 9.2 oz)  Energy Calorie Requirements (kcal): 1405  Energy Need Method: [place]-St Jeor(x1.3)  Protein Requirements: 66 g  Weight Used For Protein Calculations: 55.6 kg (122 lb 9.2 oz)     Estimated Fluid Requirement Method: RDA Method(or per MD)  RDA Method (mL): 1405  CHO Requirement: 50 % EEN      Nutrition Prescription Ordered    Nutrition Order Comments: Full liquid diabetic diet + boost glucose control all meals    Evaluation of Received Nutrient/Fluid Intake          Intake/Output Summary (Last 24 hours) at 8/27/2019 1037  Last data filed at 8/27/2019 1000  Gross per 24 hour   Intake 3138 ml   Output 400 ml   Net 2738 ml         % Intake of Estimated Energy Needs: Other: diet just advanced  % Meal Intake: Other: diet just advanced    Nutrition Risk      2xweekly    Assessment and Plan    Severe malnutrition    Malnutrition in the context of Chronic Illness/Injury    Related to (etiology):  Inadequate energy intake     Signs and Symptoms (as evidenced by):  Energy Intake: <50% of estimated energy requirement for > 4 days   Body Fat Depletion: mild depletion of triceps and thoracic and lumbar region   Muscle Mass Depletion: mild depletion of temples, clavicle region, scapular region and interosseous muscle   Weight Loss:23.75 % within the last 9 months   Fluid Accumulation: moderate    Interventions/Recommendations (treatment strategy):  See above     Nutrition Diagnosis Status:  New           Monitor and Evaluation    Food and Nutrient Intake: energy intake, food and beverage intake  Food and Nutrient Adminstration: diet order  Anthropometric  Measurements: weight  Biochemical Data, Medical Tests and Procedures: electrolyte and renal panel, glucose/endocrine profile  Nutrition-Focused Physical Findings: overall appearance     Malnutrition Assessment                 Orbital Region (Subcutaneous Fat Loss): mild depletion  Upper Arm Region (Subcutaneous Fat Loss): mild depletion   Yazdanism Region (Muscle Loss): mild depletion  Clavicle Bone Region (Muscle Loss): mild depletion  Clavicle and Acromion Bone Region (Muscle Loss): mild depletion  Dorsal Hand (Muscle Loss): mild depletion                 Nutrition Follow-Up    RD Follow-up?: Yes

## 2019-08-27 NOTE — HPI
48-year-old female with a  history of type 2 diabetes mellitus (on oral agents only), hypertension and hyperlipidemia presented with a three-day history of upset stomach without diarrhea or vomiting or hematochezia. No fever or chlls, no cough or sputum production. Noted to be in Diabetic Ketoacidosis with glucose> 500. Admitted to ICU for fluid resuscitation and iv insulin drip

## 2019-08-27 NOTE — NURSING
Patient assessed for diabetes educational needs following chart review  Sister whom she lives with at bedside for majority of info  patient reports feels much better but is groggy  She was diagnosed 10 years ago  She has a home glucose monitor and checks 2 times daily but is unable to provide what her glucose averages and ranges were prior to admit  She is prescribed oral agents which she was consistently taking until 2-3 days prior to admit.  She did not take them due to recent oral surgery, was taking loratab for pain, and not eating consistently  Discussed sick day info and provided with literature Siobhan on Diabetes sick day plans  Discussed possibility of insulin injections and she is very reluctant.  Sister unsure if she will be able to assist with injections  Will follow-up and complete teaching when patient better able to participate.  Have set up and appt. With patient and sister for 10am  8/28/19  Literature provided     Statement Selected

## 2019-08-27 NOTE — SUBJECTIVE & OBJECTIVE
Past Medical History:   Diagnosis Date    Diabetes mellitus     HLD (hyperlipidemia)     Hypertension     Schizophrenia        History reviewed. No pertinent surgical history.    Review of patient's allergies indicates:  No Known Allergies    Family History     None        Tobacco Use    Smoking status: Never Smoker    Smokeless tobacco: Never Used   Substance and Sexual Activity    Alcohol use: Never     Frequency: Never    Drug use: Never    Sexual activity: Not on file         Review of Systems   Constitutional: Positive for fatigue and fever. Negative for unexpected weight change.   HENT: Negative.  Negative for congestion, postnasal drip, rhinorrhea, sinus pressure and sneezing.    Eyes: Negative.    Respiratory: Negative.    Cardiovascular: Negative.  Negative for chest pain, palpitations and leg swelling.   Gastrointestinal: Positive for nausea and vomiting. Negative for abdominal pain.   Endocrine: Positive for polydipsia and polyuria.   Genitourinary: Negative.    Musculoskeletal: Negative.  Negative for arthralgias and back pain.   Skin: Negative for rash.   Neurological: Positive for dizziness and weakness. Negative for syncope and light-headedness.   Hematological: Negative.  Negative for adenopathy. Does not bruise/bleed easily.   Psychiatric/Behavioral: Negative.  Negative for dysphoric mood and sleep disturbance. The patient is not nervous/anxious.    All other systems reviewed and are negative.    Objective:     Vital Signs (Most Recent):  Temp: 98.6 °F (37 °C) (08/27/19 1105)  Pulse: 75 (08/27/19 1400)  Resp: 14 (08/27/19 1400)  BP: (!) 115/53 (08/27/19 1400)  SpO2: 99 % (08/27/19 1400) Vital Signs (24h Range):  Temp:  [98.3 °F (36.8 °C)-98.9 °F (37.2 °C)] 98.6 °F (37 °C)  Pulse:  [] 75  Resp:  [9-20] 14  SpO2:  [98 %-100 %] 99 %  BP: (100-157)/(35-82) 115/53     Weight: 55.6 kg (122 lb 7.5 oz)  Body mass index is 24.74 kg/m².      Intake/Output Summary (Last 24 hours) at 8/27/2019  1500  Last data filed at 8/27/2019 1220  Gross per 24 hour   Intake 3615.3 ml   Output 1000 ml   Net 2615.3 ml       Physical Exam   Constitutional: She is oriented to person, place, and time. She appears well-developed and well-nourished.   HENT:   Head: Normocephalic and atraumatic.   Eyes: Pupils are equal, round, and reactive to light. Conjunctivae are normal.   Neck: Neck supple. No JVD present. No tracheal deviation present. No thyromegaly present.   Cardiovascular: Normal rate, regular rhythm and normal heart sounds.   Pulmonary/Chest: Effort normal and breath sounds normal. No respiratory distress. She has no wheezes. She has no rales. She exhibits no tenderness.   Abdominal: Soft. Bowel sounds are normal.   Musculoskeletal: Normal range of motion. She exhibits no edema.   Lymphadenopathy:     She has no cervical adenopathy.   Neurological: She is alert and oriented to person, place, and time.   Skin: Skin is warm and dry.   Nursing note and vitals reviewed.      Vents:       Lines/Drains/Airways     Peripheral Intravenous Line                 Peripheral IV - Single Lumen 08/26/19 1743 20 G Left Antecubital less than 1 day         Peripheral IV - Single Lumen 08/27/19 0218 22 G Right Hand less than 1 day                Significant Labs:    CBC/Anemia Profile:  Recent Labs   Lab 08/26/19  1743   WBC 10.56   HGB 13.2   HCT 40.9      MCV 80*   RDW 15.0*        Chemistries:  Recent Labs   Lab 08/26/19  1743 08/27/19  0158 08/27/19  0600 08/27/19  0752 08/27/19  1351   * 139  139 138 138 134*   K 4.6 4.2  4.2 4.4 4.0 4.9   CL 95 107  107 110 109 105   CO2 13* 17*  17* 20* 18* 21*   BUN 29* 17  17 14 13 9   CREATININE 1.9* 1.1  1.1 1.0 0.9 1.0   CALCIUM 10.2 8.6*  8.6* 8.1* 8.3* 8.4*   ALBUMIN 4.4  --   --   --   --    PROT 8.8*  --   --   --   --    BILITOT 0.4  --   --   --   --    ALKPHOS 87  --   --   --   --    ALT 7*  --   --   --   --    AST 9*  --   --   --   --    MG  --  2.1 3.1*  --    --    PHOS  --  1.7* 1.2*  --   --        BMP:   Recent Labs   Lab 08/27/19  0600  08/27/19  1351   *   < > 341*      < > 134*   K 4.4   < > 4.9      < > 105   CO2 20*   < > 21*   BUN 14   < > 9   CREATININE 1.0   < > 1.0   CALCIUM 8.1*   < > 8.4*   MG 3.1*  --   --     < > = values in this interval not displayed.     CMP:   Recent Labs   Lab 08/26/19  1743  08/27/19  0600 08/27/19  0752 08/27/19  1351   *   < > 138 138 134*   K 4.6   < > 4.4 4.0 4.9   CL 95   < > 110 109 105   CO2 13*   < > 20* 18* 21*   *   < > 218* 236* 341*   BUN 29*   < > 14 13 9   CREATININE 1.9*   < > 1.0 0.9 1.0   CALCIUM 10.2   < > 8.1* 8.3* 8.4*   PROT 8.8*  --   --   --   --    ALBUMIN 4.4  --   --   --   --    BILITOT 0.4  --   --   --   --    ALKPHOS 87  --   --   --   --    AST 9*  --   --   --   --    ALT 7*  --   --   --   --    ANIONGAP 27*   < > 8 11 8   EGFRNONAA 30.3*   < > >60 >60 >60    < > = values in this interval not displayed.     All pertinent labs within the past 24 hours have been reviewed.    Significant Imaging:   I have reviewed all pertinent imaging results/findings within the past 24 hours.  I have reviewed and interpreted all pertinent imaging results/findings within the past 24 hours.

## 2019-08-27 NOTE — PLAN OF CARE
Pt presented to the hospital with complaints of nausea, vomiting, and diarrhea and found to be hyperglycemic.  The pt reports a loss of appetite for 2-3 days and inability to take insulin medication due to lack of food intake.  Pt alert and oriented to person, place, and time.  Pt denies depression, anxiety, SI, HI.  Prior to admission and onset of symptoms, pt was living at home with her sister in law and was independent with ADLs.  CM provided a transitional care folder, information on advanced directives, information on pharmacy bedside delivery, and discharge planning begins on admission with contact information for any needs/questions.    D/C plan: home        08/27/19 1027   Discharge Assessment   Assessment Type Discharge Planning Assessment   Confirmed/corrected address and phone number on facesheet? Yes   Assessment information obtained from? Patient;Medical Record   Expected Length of Stay (days)   (tbd)   Communicated expected length of stay with patient/caregiver yes   Prior to hospitilization cognitive status: Alert/Oriented   Prior to hospitalization functional status: Independent   Current cognitive status: Alert/Oriented   Current Functional Status: Independent   Facility Arrived From: home   Lives With other relative(s)   Able to Return to Prior Arrangements yes   Is patient able to care for self after discharge? Yes   Who are your caregiver(s) and their phone number(s)? Jessica Duron, sister: [phone]   Patient's perception of discharge disposition home or selfcare   Readmission Within the Last 30 Days no previous admission in last 30 days   Patient currently being followed by outpatient case management? No   Patient currently receives any other outside agency services? No   Equipment Currently Used at Home glucometer   Do you have any problems affording any of your prescribed medications? No   Is the patient taking medications as prescribed? no   If no, which medications is patient not taking?  insulin   Does the patient have transportation home? Yes   Transportation Anticipated family or friend will provide   Does the patient receive services at the [facility]? No   Discharge Plan A Home   Discharge Plan B Other  (tbd)   DME Needed Upon Discharge  none   Patient/Family in Agreement with Plan yes

## 2019-08-27 NOTE — HPI
The patient is a 48-year-old female with a past history of type 2 diabetes mellitus, hypertension and hyperlipidemia presented with a three-day history of upset stomach without diarrhea or vomiting or hematochezia.  The patient states she had not been using her insulin because she had not been able to eat and went over to see a doctor yesterday where she was found to have hypoglycemia and sent the emergency department right away.  Initial blood sugars were over 100 and she was given 14 units insulin subcutaneously around 3:00 p.m. yesterday at the primary care physician's office.  Initial blood sugars on arrival at [facility] was still over 500 and she was started on insulin drip.

## 2019-08-27 NOTE — ASSESSMENT & PLAN NOTE
IV insulin drip will be continued and dose is lowered appropriately.  BMPs will be checked to determine need for replacement pedicle of potassium.  The phosphorus also will be checked along with magnesium.  August for replacement initiated.  Total critical care time spent on the patient excluding any separately billable procedure 33 min

## 2019-08-27 NOTE — ASSESSMENT & PLAN NOTE
IV hydration should result in improvement.  If not it means there is chronic kidney disease probably secondary to diabetes

## 2019-08-27 NOTE — SUBJECTIVE & OBJECTIVE
Past Medical History:   Diagnosis Date    Diabetes mellitus     HLD (hyperlipidemia)     Hypertension     Schizophrenia        History reviewed. No pertinent surgical history.    Review of patient's allergies indicates:  No Known Allergies    No current facility-administered medications on file prior to encounter.      Current Outpatient Medications on File Prior to Encounter   Medication Sig    alogliptin (NESINA) 25 mg Tab Take 25 mg by mouth.    amLODIPine (NORVASC) 10 MG tablet Take 10 mg by mouth once daily.    atorvastatin (LIPITOR) 40 MG tablet Take 40 mg by mouth once daily.    carvedilol (COREG) 6.25 MG tablet Take 6.25 mg by mouth 2 (two) times daily with meals.    cholecalciferol, vitamin D3, (VITAMIN D3) 5,000 unit Tab Take 5,000 Units by mouth once daily.    ferrous gluconate (FERGON) 324 MG tablet Take 324 mg by mouth daily with breakfast.    glimepiride (AMARYL) 4 MG tablet Take 4 mg by mouth before breakfast.    hydroCHLOROthiazide (HYDRODIURIL) 12.5 MG Tab Take 12.5 mg by mouth once daily.    metFORMIN (GLUCOPHAGE) 500 MG tablet Take 500 mg by mouth 2 (two) times daily with meals.    polyethylene glycol (GLYCOLAX) 17 gram PwPk Take by mouth.    valsartan (DIOVAN) 160 MG tablet Take 160 mg by mouth once daily.    ziprasidone (GEODON) 60 MG Cap Take 20 mg by mouth 2 (two) times daily.     Family History     None        Tobacco Use    Smoking status: Never Smoker    Smokeless tobacco: Never Used   Substance and Sexual Activity    Alcohol use: Never     Frequency: Never    Drug use: Never    Sexual activity: Not on file     Review of Systems   Constitutional: Positive for appetite change. Negative for activity change, chills, diaphoresis, fatigue, fever and unexpected weight change.   HENT: Positive for dental problem. Negative for congestion, drooling, ear discharge, facial swelling, nosebleeds, trouble swallowing and voice change.         Just had multiple teeth extraction   Eyes:  Negative for photophobia, pain, discharge, redness, itching and visual disturbance.   Respiratory: Negative for apnea, cough, choking, chest tightness and shortness of breath.    Cardiovascular: Negative for chest pain, palpitations and leg swelling.   Gastrointestinal: Negative for abdominal distention, abdominal pain, blood in stool, constipation, diarrhea, nausea and vomiting.   Endocrine: Negative for cold intolerance, heat intolerance, polydipsia, polyphagia and polyuria.   Genitourinary: Negative for difficulty urinating, dysuria, frequency, hematuria, menstrual problem and pelvic pain.   Musculoskeletal: Negative for arthralgias, back pain, gait problem, myalgias, neck pain and neck stiffness.   Skin: Negative for color change, pallor, rash and wound.   Allergic/Immunologic: Negative for environmental allergies, food allergies and immunocompromised state.   Neurological: Negative for dizziness, tremors, syncope, facial asymmetry, weakness, light-headedness and headaches.   Psychiatric/Behavioral: Negative for agitation, behavioral problems, confusion, decreased concentration and suicidal ideas. The patient is not nervous/anxious and is not hyperactive.      Objective:     Vital Signs (Most Recent):  Temp: 98.4 °F (36.9 °C) (08/27/19 0130)  Pulse: 79 (08/27/19 0145)  Resp: 13 (08/27/19 0145)  BP: (!) 157/75 (08/27/19 0130)  SpO2: 100 % (08/27/19 0145) Vital Signs (24h Range):  Temp:  [98.4 °F (36.9 °C)-98.5 °F (36.9 °C)] 98.4 °F (36.9 °C)  Pulse:  [] 79  Resp:  [12-20] 13  SpO2:  [99 %-100 %] 100 %  BP: (102-157)/(55-82) 157/75     Weight: 55.6 kg (122 lb 7.5 oz)  Body mass index is 24.74 kg/m².    Physical Exam   Constitutional: She is oriented to person, place, and time. She appears well-developed and well-nourished. No distress.   HENT:   Head: Normocephalic.   Right Ear: External ear normal.   Left Ear: External ear normal.   Nose: Nose normal.   Mouth/Throat: Oropharynx is clear and moist. No  oropharyngeal exudate.   Malaligned incisor and canine. Lower left incisor and canine sockets blood tinged without active bleeding   Eyes: Pupils are equal, round, and reactive to light. Conjunctivae and EOM are normal. Right eye exhibits no discharge. Left eye exhibits no discharge. No scleral icterus.   Neck: Normal range of motion. Neck supple. No JVD present. No tracheal deviation present. No thyromegaly present.   Cardiovascular: Normal rate, regular rhythm, normal heart sounds and intact distal pulses. Exam reveals no gallop and no friction rub.   No murmur heard.  Pulmonary/Chest: Effort normal and breath sounds normal. No stridor. No respiratory distress. She has no wheezes. She has no rales. She exhibits no tenderness.   Abdominal: Soft. Bowel sounds are normal. She exhibits no distension and no mass. There is no tenderness. There is no rebound and no guarding. No hernia.   Musculoskeletal: Normal range of motion. She exhibits no edema, tenderness or deformity.   Lymphadenopathy:     She has no cervical adenopathy.   Neurological: She is alert and oriented to person, place, and time. She displays normal reflexes. No cranial nerve deficit or sensory deficit. She exhibits normal muscle tone. Coordination normal.   Skin: Skin is warm and dry. No rash noted. She is not diaphoretic. No erythema. No pallor.   Psychiatric: She has a normal mood and affect. Her behavior is normal. Judgment and thought content normal.   Nursing note and vitals reviewed.        CRANIAL NERVES     CN III, IV, VI   Pupils are equal, round, and reactive to light.  Extraocular motions are normal.        Significant Labs:   Recent Lab Results       08/27/19  0127   08/27/19  0009   08/26/19  2133   08/26/19 2129   08/26/19  2016        Albumin               Alkaline Phosphatase               Allens Test               ALT               Anion Gap               Appearance, UA       Clear       AST               Bacteria, UA       Few        Baso #               Basophil%               Beta-Hydroxybutyrate               Bilirubin (UA)       2+  Comment:  Positive urine bilirubin is not confirmed. Correlate with   serum bilirubin and clinical presentation.         BILIRUBIN TOTAL               BNP               Site               BUN, Bld               Calcium               Chloride               CO2               Color, UA       Yellow       Creatinine               DelSys               Differential Method               eGFR if                eGFR if non                Eos #               Eosinophil%               Glucose               Glucose, UA       2+       Gran # (ANC)               Gran%               Granular Casts, UA       2       Hematocrit               Hemoglobin               Hyaline Casts, UA       5       Ketones, UA       3+       Leukocytes, UA       Negative       Lymph #               Lymph%               MCH               MCHC               MCV               Microscopic Comment       SEE COMMENT  Comment:  Other formed elements not mentioned in the report are not   present in the microscopic examination.          Mode               Mono #               Mono%               MPV               NITRITE UA       Negative       Occult Blood UA       Trace       pH, UA       6.0       Platelets               POC BE               POC HCO3               POC PCO2               POC PH               POC PO2               POC SATURATED O2               POCT Glucose 169 222 178   161     Potassium               PROTEIN TOTAL               Protein, UA       Trace  Comment:  Recommend a 24 hour urine protein or a urine   protein/creatinine ratio if globulin induced proteinuria is  clinically suspected.         RBC               RBC, UA       2       RDW               Sample               Sodium               Specific Gravity, UA       >=1.030       Specimen UA       Urine, Clean Catch       Squam Epithel, UA       3        Troponin I               UROBILINOGEN UA       Negative       WBC, UA       4       WBC                                08/26/19  1755   08/26/19  1743   08/26/19  1736        Albumin   4.4       Alkaline Phosphatase   87       Allens Test N/A         ALT   7       Anion Gap   27       Appearance, UA           AST   9       Bacteria, UA           Baso #   0.03       Basophil%   0.3       Beta-Hydroxybutyrate   6.5       Bilirubin (UA)           BILIRUBIN TOTAL   0.4  Comment:  For infants and newborns, interpretation of results should be based  on gestational age, weight and in agreement with clinical  observations.  Premature Infant recommended reference ranges:  Up to 24 hours.............<8.0 mg/dL  Up to 48 hours............<12.0 mg/dL  3-5 days..................<15.0 mg/dL  6-29 days.................<15.0 mg/dL         BNP   <10  Comment:  Values of less than 100 pg/ml are consistent with non-CHF populations.       Site RB         BUN, Bld   29       Calcium   10.2       Chloride   95       CO2   13       Color, UA           Creatinine   1.9       DelSys Room Air         Differential Method   Automated       eGFR if    34.9       eGFR if non    30.3  Comment:  Calculation used to obtain the estimated glomerular filtration  rate (eGFR) is the CKD-EPI equation.          Eos #   0.0       Eosinophil%   0.1       Glucose   504  Comment:  Glucose critical result(s) called and verbal readback obtained   from Chandan Aden, 08/26/2019 18:17         Glucose, UA           Gran # (ANC)   8.4       Gran%   80.0       Granular Casts, UA           Hematocrit   40.9       Hemoglobin   13.2       Hyaline Casts, UA           Ketones, UA           Leukocytes, UA           Lymph #   1.7       Lymph%   15.6       MCH   25.9       MCHC   32.3       MCV   80       Microscopic Comment           Mode AVAPS         Mono #   0.4       Mono%   4.0       MPV   13.6       NITRITE UA           Occult  Blood UA           pH, UA           Platelets   277       POC BE -14         POC HCO3 12.3         POC PCO2 24.1         POC PH 7.316         POC PO2 101         POC SATURATED O2 97         POCT Glucose     444     Potassium   4.6       PROTEIN TOTAL   8.8       Protein, UA           RBC   5.10       RBC, UA           RDW   15.0       Sample ARTERIAL         Sodium   135       Specific [place], UA           Specimen UA           Squam Epithel, UA           Troponin I   <0.006  Comment:  The reference interval for Troponin I represents the 99th percentile   cutoff   for our facility and is consistent with 3rd generation assay   performance.         UROBILINOGEN UA           WBC, UA           WBC   10.56             Significant Imaging:   Imaging Results          X-Ray Chest AP Portable (Final result)  Result time 08/26/19 18:12:04    Final result by DANA Ceballos Sr., MD (08/26/19 18:12:04)                 Impression:      Normal study.      Electronically signed by: Sonu Ceballos MD  Date:    08/26/2019  Time:    18:12             Narrative:    EXAMINATION:  XR CHEST AP PORTABLE    CLINICAL HISTORY:  hyperglycemia;    COMPARISON:  None    FINDINGS:  The size of the heart is normal. The lungs are clear. There is no pneumothorax.  The costophrenic angles are sharp.

## 2019-08-27 NOTE — SIGNIFICANT EVENT
47 y/o aaf admitted to ICU with a dx of DKA . She was started on DKA protocol . The  Gap closed and the bicarb is > 20 . Pt was  Overlap with long actin insulin .  The Hba1c is > 14 . She will need long acting and short acting insulin .Cont current tx .

## 2019-08-27 NOTE — EICU
EICU    Pt is a 49 y/o f with type 2 DM on orals only presents with three days upset stomach with nonbloody emesis/nausea for 2-3 days shortly after having a tooth pulled, +polyuria/polydypsia; no oral meds in 2-3 days b/c nausea.  Denies f/c/facial pain/ha/nuchal rigitidy/cough/dysuria/diarrhea/vaginal d/c/ last mp 2-3 months ago, not sexually active, no sores on backside/le/no rash.  PT found to have elevated fs in pcp office, given 14 u insluin (ER note) and came to Er found to be in DKA, received IV fluids    pe vss afeb  Pt wdwn f lying in no distress nontoxic answers easily, nonlabored breathing, alert and oriented x 3 knows Trump is pres  No oral/nasal turb eschar  No rash   All physical findings per d/w nursing    Na 135 k 4.6 --> 4/2 HCO3 13 ag 27 bun 29 creat 1.9 gluc 504 Ca 10.2  ast 9 alt 7 alk phos 87 tb 0.4 tp 8.8 alb 4.4  Beta 6.5 tsh 0.557 lac 1.7  bnp < 10  Trop <.006  Wbc 0 P 80 L 15  hgb 13 mcv 80   plt 277  ua sg >1.03 3+ ketones 4 wbc neg nitr/LE 3 sq ep few bacteria    cxr nl car silhouette no in/eff  ekg sr nl ax/int lvh by volts    A/P   DKA/DELMIS  Gastroenteritis seems most likely precipitant, pt with recent tooth extraction, possible infectious culprit, no ha/sinus complaints, seeding of distal organs also possible.  Pt better with insulin and fluids, gap closing, creat better  - cont keep K replete while on insulin gtt and hyperosmolar  - hd stable, crystalloid as needed  - sao2 ok on ra, nonlabored breathing  - creat elevated, suspect related to hypovolemia   +/- ATN; image and urine lytes inf not improving with volume and glycemic control; ua 4 wbc 3 sq ep, neg nitrites/LE +hyaline casts not completely c/w UTI  - ms intact, clear  - no abx for now, no clear source  - insulin gtt as gap is closing, then add longacting subq, overlap 2-3 hours, then d/c gtt and feed pt as her appetite is coming back slowly  - dvt proph hep subq  - oob to chair  - diabetic education, hgba1c

## 2019-08-27 NOTE — PLAN OF CARE
Problem: Adult Inpatient Plan of Care  Goal: Plan of Care Review  Outcome: Ongoing (interventions implemented as appropriate)  Recommendations     Recommendation: 1. Continue current diet & ONS. 2. RD to f/u.  Intervention: Coordination of care   Goals: Meet > 85 % EEN/EPN while admitted   Nutrition Goal Status: new  Communication of RD Recs: reviewed with RN

## 2019-08-27 NOTE — ASSESSMENT & PLAN NOTE
Will continue with home medications bowel so check CK to rule out statin induced abdominal discomfort

## 2019-08-27 NOTE — PLAN OF CARE
Problem: Adult Inpatient Plan of Care  Goal: Plan of Care Review  Outcome: Ongoing (interventions implemented as appropriate)  Pt AAOx3. NSR on monitor. Room air. NPO - no N/V/D at this time. Pt c/o decreased appetite and trouble swallowing due to recent dental work (teeth pulled). Voids spontaneously. Ambulates independently. PIVs intact - insulin gtt infusing & titrated per nomogram, D5 1/2NS @100. Mg (2g) & K (40mEq) replaced this AM. Bed low, wheels locked, call light within reach. Pt instructed to call for assistance and education on NPO status + hourly blood glucose monitoring. POC reviewed.

## 2019-08-27 NOTE — CONSULTS
[facility] -   Critical Care Medicine  Consult Note    Patient Name: Francia Duron  MRN: [id number]  Admission Date: 8/26/2019  Hospital Length of Stay: 0 days  Code Status: Full Code  Attending Physician: Ulises Shane, *   Primary Care Provider: Singh Sanders MD   Principal Problem: Diabetic ketoacidosis without coma associated with type 2 diabetes mellitus      Subjective:     HPI:   48-year-old female with a  history of type 2 diabetes mellitus (on oral agents only), hypertension and hyperlipidemia presented with a three-day history of upset stomach without diarrhea or vomiting or hematochezia. No fever or chlls, no cough or sputum production. Noted to be in Diabetic Ketoacidosis with glucose> 500. Admitted to ICU for fluid resuscitation and iv insulin drip    Hospital/ICU Course:  8/27 Improved sub Q insulin started    Past Medical History:   Diagnosis Date    Diabetes mellitus     HLD (hyperlipidemia)     Hypertension     Schizophrenia        History reviewed. No pertinent surgical history.    Review of patient's allergies indicates:  No Known Allergies    Family History     None        Tobacco Use    Smoking status: Never Smoker    Smokeless tobacco: Never Used   Substance and Sexual Activity    Alcohol use: Never     Frequency: Never    Drug use: Never    Sexual activity: Not on file         Review of Systems   Constitutional: Positive for fatigue and fever. Negative for unexpected weight change.   HENT: Negative.  Negative for congestion, postnasal drip, rhinorrhea, sinus pressure and sneezing.    Eyes: Negative.    Respiratory: Negative.    Cardiovascular: Negative.  Negative for chest pain, palpitations and leg swelling.   Gastrointestinal: Positive for nausea and vomiting. Negative for abdominal pain.   Endocrine: Positive for polydipsia and polyuria.   Genitourinary: Negative.    Musculoskeletal: Negative.  Negative for arthralgias and back pain.   Skin: Negative for  rash.   Neurological: Positive for dizziness and weakness. Negative for syncope and light-headedness.   Hematological: Negative.  Negative for adenopathy. Does not bruise/bleed easily.   Psychiatric/Behavioral: Negative.  Negative for dysphoric mood and sleep disturbance. The patient is not nervous/anxious.    All other systems reviewed and are negative.    Objective:     Vital Signs (Most Recent):  Temp: 98.6 °F (37 °C) (08/27/19 1105)  Pulse: 75 (08/27/19 1400)  Resp: 14 (08/27/19 1400)  BP: (!) 115/53 (08/27/19 1400)  SpO2: 99 % (08/27/19 1400) Vital Signs (24h Range):  Temp:  [98.3 °F (36.8 °C)-98.9 °F (37.2 °C)] 98.6 °F (37 °C)  Pulse:  [] 75  Resp:  [9-20] 14  SpO2:  [98 %-100 %] 99 %  BP: (100-157)/(35-82) 115/53     Weight: 55.6 kg (122 lb 7.5 oz)  Body mass index is 24.74 kg/m².      Intake/Output Summary (Last 24 hours) at 8/27/2019 1500  Last data filed at 8/27/2019 1220  Gross per 24 hour   Intake 3615.3 ml   Output 1000 ml   Net 2615.3 ml       Physical Exam   Constitutional: She is oriented to person, place, and time. She appears well-developed and well-nourished.   HENT:   Head: Normocephalic and atraumatic.   Eyes: Pupils are equal, round, and reactive to light. Conjunctivae are normal.   Neck: Neck supple. No JVD present. No tracheal deviation present. No thyromegaly present.   Cardiovascular: Normal rate, regular rhythm and normal heart sounds.   Pulmonary/Chest: Effort normal and breath sounds normal. No respiratory distress. She has no wheezes. She has no rales. She exhibits no tenderness.   Abdominal: Soft. Bowel sounds are normal.   Musculoskeletal: Normal range of motion. She exhibits no edema.   Lymphadenopathy:     She has no cervical adenopathy.   Neurological: She is alert and oriented to person, place, and time.   Skin: Skin is warm and dry.   Nursing note and vitals reviewed.      Vents:       Lines/Drains/Airways     Peripheral Intravenous Line                 Peripheral IV -  Single Lumen 08/26/19 1743 20 G Left Antecubital less than 1 day         Peripheral IV - Single Lumen 08/27/19 0218 22 G Right Hand less than 1 day                Significant Labs:    CBC/Anemia Profile:  Recent Labs   Lab 08/26/19  1743   WBC 10.56   HGB 13.2   HCT 40.9      MCV 80*   RDW 15.0*        Chemistries:  Recent Labs   Lab 08/26/19  1743 08/27/19  0158 08/27/19  0600 08/27/19 0752 08/27/19  1351   * 139  139 138 138 134*   K 4.6 4.2  4.2 4.4 4.0 4.9   CL 95 107  107 110 109 105   CO2 13* 17*  17* 20* 18* 21*   BUN 29* 17  17 14 13 9   CREATININE 1.9* 1.1  1.1 1.0 0.9 1.0   CALCIUM 10.2 8.6*  8.6* 8.1* 8.3* 8.4*   ALBUMIN 4.4  --   --   --   --    PROT 8.8*  --   --   --   --    BILITOT 0.4  --   --   --   --    ALKPHOS 87  --   --   --   --    ALT 7*  --   --   --   --    AST 9*  --   --   --   --    MG  --  2.1 3.1*  --   --    PHOS  --  1.7* 1.2*  --   --        BMP:   Recent Labs   Lab 08/27/19  0600 08/27/19  1351   *   < > 341*      < > 134*   K 4.4   < > 4.9      < > 105   CO2 20*   < > 21*   BUN 14   < > 9   CREATININE 1.0   < > 1.0   CALCIUM 8.1*   < > 8.4*   MG 3.1*  --   --     < > = values in this interval not displayed.     CMP:   Recent Labs   Lab 08/26/19 1743 08/27/19  0600 08/27/19 0752 08/27/19  1351   *   < > 138 138 134*   K 4.6   < > 4.4 4.0 4.9   CL 95   < > 110 109 105   CO2 13*   < > 20* 18* 21*   *   < > 218* 236* 341*   BUN 29*   < > 14 13 9   CREATININE 1.9*   < > 1.0 0.9 1.0   CALCIUM 10.2   < > 8.1* 8.3* 8.4*   PROT 8.8*  --   --   --   --    ALBUMIN 4.4  --   --   --   --    BILITOT 0.4  --   --   --   --    ALKPHOS 87  --   --   --   --    AST 9*  --   --   --   --    ALT 7*  --   --   --   --    ANIONGAP 27*   < > 8 11 8   EGFRNONAA 30.3*   < > >60 >60 >60    < > = values in this interval not displayed.     All pertinent labs within the past 24 hours have been reviewed.    Significant Imaging:   I have reviewed all  pertinent imaging results/findings within the past 24 hours.  I have reviewed and interpreted all pertinent imaging results/findings within the past 24 hours.      ABG  Recent Labs   Lab 08/26/19  1755   PH 7.316*   PO2 101*   PCO2 24.1*   HCO3 12.3*   BE -14     Assessment/Plan:     Renal/  Bacteriuria with pyuria  Cultures pending    DELMIS (acute kidney injury)  8/27 Continue volume expansion    Endocrine  * Diabetic ketoacidosis without coma associated with type 2 diabetes mellitus  8/27 Switch to SUBQ insulin        Critical Care Daily Checklist:    A: Awake: RASS Goal/Actual Goal:    Actual: Hansen Agitation Sedation Scale (RASS): Alert and calm   B: Spontaneous Breathing Trial Performed?     C: SAT & SBT Coordinated?  na                      D: Delirium: CAM-ICU Overall CAM-ICU: Negative   E: Early Mobility Performed? Yes   F: Feeding Goal: Goals: Meet > 85 % EEN/EPN while admitted   Status: Nutrition Goal Status: new   Current Diet Order   Procedures    Diet full liquid [facility]; Consistent Carbohydrate     Order Specific Question:   Indicate patient location for additional diet options:     Answer:   [facility]     Order Specific Question:   Additional Diet Options:     Answer:   Consistent Carbohydrate      AS: Analgesia/Sedation adequate   T: Thromboembolic Prophylaxis y   H: HOB > 300 Yes   U: Stress Ulcer Prophylaxis (if needed) y   G: Glucose Control improved   B: Bowel Function Stool Occurrence: 0   I: Indwelling Catheter (Lines & Freedman) Necessity needed   D: De-escalation of Antimicrobials/Pharmacotherapies na    Plan for the day/ETD out of bed , possible transfer    Code Status:  Family/Goals of Care: Full Code  No one available     Critical Care Time: 34 minutes  Critical secondary to Patient has a condition that poses threat to life and bodily function: Diabetic Ketoacidosis      Critical care was time spent personally by me on the following activities: development of treatment  plan with patient or surrogate and bedside caregivers, discussions with consultants, evaluation of patient's response to treatment, examination of patient, ordering and performing treatments and interventions, ordering and review of laboratory studies, ordering and review of radiographic studies, pulse oximetry, re-evaluation of patient's condition. This critical care time did not overlap with that of any other provider or involve time for any procedures.    Thank you for your consult. I will follow-up with patient. Please contact us if you have any additional questions.     Rohit Yates MD  Critical Care Medicine  [facility]

## 2019-08-27 NOTE — PROGRESS NOTES
Pt transferred to tele room 236 via wheelchair and portable tele monitor.  Bedside report given.  Merlin WU given pt insulin pens.  Pt personal belongings with pt and pt already called her family on her cellphone to notify them of transfer to new room.  Pt has no questions at this time.  Will sign off.

## 2019-08-27 NOTE — H&P
[facility] Hospital Medicine  History & Physical    Patient Name: Francia Duron  MRN: [id number]  Admission Date: 8/26/2019  Attending Physician: Angella Do MD  Primary Care Provider: Singh Sanders MD         Patient information was obtained from patient and ER records.     Subjective:     Principal Problem:Diabetic ketoacidosis without coma associated with type 2 diabetes mellitus    Chief Complaint:   Chief Complaint   Patient presents with    Hyperglycemia     sent by PCP        HPI: The patient is a 48-year-old female with a past history of type 2 diabetes mellitus, hypertension and hyperlipidemia presented with a three-day history of upset stomach without diarrhea or vomiting or hematochezia.  The patient states she had not been using her insulin because she had not been able to eat and went over to see a doctor yesterday where she was found to have hypoglycemia and sent the emergency department right away.  Initial blood sugars were over 100 and she was given 14 units insulin subcutaneously around 3:00 p.m. yesterday at the primary care physician's office.  Initial blood sugars on arrival at [facility] was still over 500 and she was started on insulin drip.    Past Medical History:   Diagnosis Date    Diabetes mellitus     HLD (hyperlipidemia)     Hypertension     Schizophrenia        History reviewed. No pertinent surgical history.    Review of patient's allergies indicates:  No Known Allergies    No current facility-administered medications on file prior to encounter.      Current Outpatient Medications on File Prior to Encounter   Medication Sig    alogliptin (NESINA) 25 mg Tab Take 25 mg by mouth.    amLODIPine (NORVASC) 10 MG tablet Take 10 mg by mouth once daily.    atorvastatin (LIPITOR) 40 MG tablet Take 40 mg by mouth once daily.    carvedilol (COREG) 6.25 MG tablet Take 6.25 mg by mouth 2 (two) times daily with meals.    cholecalciferol, vitamin D3, (VITAMIN D3)  5,000 unit Tab Take 5,000 Units by mouth once daily.    ferrous gluconate (FERGON) 324 MG tablet Take 324 mg by mouth daily with breakfast.    glimepiride (AMARYL) 4 MG tablet Take 4 mg by mouth before breakfast.    hydroCHLOROthiazide (HYDRODIURIL) 12.5 MG Tab Take 12.5 mg by mouth once daily.    metFORMIN (GLUCOPHAGE) 500 MG tablet Take 500 mg by mouth 2 (two) times daily with meals.    polyethylene glycol (GLYCOLAX) 17 gram PwPk Take by mouth.    valsartan (DIOVAN) 160 MG tablet Take 160 mg by mouth once daily.    ziprasidone (GEODON) 60 MG Cap Take 20 mg by mouth 2 (two) times daily.     Family History     None        Tobacco Use    Smoking status: Never Smoker    Smokeless tobacco: Never Used   Substance and Sexual Activity    Alcohol use: Never     Frequency: Never    Drug use: Never    Sexual activity: Not on file     Review of Systems   Constitutional: Positive for appetite change. Negative for activity change, chills, diaphoresis, fatigue, fever and unexpected weight change.   HENT: Positive for dental problem. Negative for congestion, drooling, ear discharge, facial swelling, nosebleeds, trouble swallowing and voice change.         Just had multiple teeth extraction   Eyes: Negative for photophobia, pain, discharge, redness, itching and visual disturbance.   Respiratory: Negative for apnea, cough, choking, chest tightness and shortness of breath.    Cardiovascular: Negative for chest pain, palpitations and leg swelling.   Gastrointestinal: Negative for abdominal distention, abdominal pain, blood in stool, constipation, diarrhea, nausea and vomiting.   Endocrine: Negative for cold intolerance, heat intolerance, polydipsia, polyphagia and polyuria.   Genitourinary: Negative for difficulty urinating, dysuria, frequency, hematuria, menstrual problem and pelvic pain.   Musculoskeletal: Negative for arthralgias, back pain, gait problem, myalgias, neck pain and neck stiffness.   Skin: Negative for  color change, pallor, rash and wound.   Allergic/Immunologic: Negative for environmental allergies, food allergies and immunocompromised state.   Neurological: Negative for dizziness, tremors, syncope, facial asymmetry, weakness, light-headedness and headaches.   Psychiatric/Behavioral: Negative for agitation, behavioral problems, confusion, decreased concentration and suicidal ideas. The patient is not nervous/anxious and is not hyperactive.      Objective:     Vital Signs (Most Recent):  Temp: 98.4 °F (36.9 °C) (08/27/19 0130)  Pulse: 79 (08/27/19 0145)  Resp: 13 (08/27/19 0145)  BP: (!) 157/75 (08/27/19 0130)  SpO2: 100 % (08/27/19 0145) Vital Signs (24h Range):  Temp:  [98.4 °F (36.9 °C)-98.5 °F (36.9 °C)] 98.4 °F (36.9 °C)  Pulse:  [] 79  Resp:  [12-20] 13  SpO2:  [99 %-100 %] 100 %  BP: (102-157)/(55-82) 157/75     Weight: 55.6 kg (122 lb 7.5 oz)  Body mass index is 24.74 kg/m².    Physical Exam   Constitutional: She is oriented to person, place, and time. She appears well-developed and well-nourished. No distress.   HENT:   Head: Normocephalic.   Right Ear: External ear normal.   Left Ear: External ear normal.   Nose: Nose normal.   Mouth/Throat: Oropharynx is clear and moist. No oropharyngeal exudate.   Malaligned incisor and canine. Lower left incisor and canine sockets blood tinged without active bleeding   Eyes: Pupils are equal, round, and reactive to light. Conjunctivae and EOM are normal. Right eye exhibits no discharge. Left eye exhibits no discharge. No scleral icterus.   Neck: Normal range of motion. Neck supple. No JVD present. No tracheal deviation present. No thyromegaly present.   Cardiovascular: Normal rate, regular rhythm, normal heart sounds and intact distal pulses. Exam reveals no gallop and no friction rub.   No murmur heard.  Pulmonary/Chest: Effort normal and breath sounds normal. No stridor. No respiratory distress. She has no wheezes. She has no rales. She exhibits no tenderness.    Abdominal: Soft. Bowel sounds are normal. She exhibits no distension and no mass. There is no tenderness. There is no rebound and no guarding. No hernia.   Musculoskeletal: Normal range of motion. She exhibits no edema, tenderness or deformity.   Lymphadenopathy:     She has no cervical adenopathy.   Neurological: She is alert and oriented to person, place, and time. She displays normal reflexes. No cranial nerve deficit or sensory deficit. She exhibits normal muscle tone. Coordination normal.   Skin: Skin is warm and dry. No rash noted. She is not diaphoretic. No erythema. No pallor.   Psychiatric: She has a normal mood and affect. Her behavior is normal. Judgment and thought content normal.   Nursing note and vitals reviewed.        CRANIAL NERVES     CN III, IV, VI   Pupils are equal, round, and reactive to light.  Extraocular motions are normal.        Significant Labs:   Recent Lab Results       08/27/19  0127   08/27/19  0009   08/26/19  2133   08/26/19  2129   08/26/19  2016        Albumin               Alkaline Phosphatase               Allens Test               ALT               Anion Gap               Appearance, UA       Clear       AST               Bacteria, UA       Few       Baso #               Basophil%               Beta-Hydroxybutyrate               Bilirubin (UA)       2+  Comment:  Positive urine bilirubin is not confirmed. Correlate with   serum bilirubin and clinical presentation.         BILIRUBIN TOTAL               BNP               Site               BUN, Bld               Calcium               Chloride               CO2               Color, UA       Yellow       Creatinine               DelSys               Differential Method               eGFR if                eGFR if non                Eos #               Eosinophil%               Glucose               Glucose, UA       2+       Gran # (ANC)               Gran%               Granular Casts, UA       2        Hematocrit               Hemoglobin               Hyaline Casts, UA       5       Ketones, UA       3+       Leukocytes, UA       Negative       Lymph #               Lymph%               MCH               MCHC               MCV               Microscopic Comment       SEE COMMENT  Comment:  Other formed elements not mentioned in the report are not   present in the microscopic examination.          Mode               Mono #               Mono%               MPV               NITRITE UA       Negative       Occult Blood UA       Trace       pH, UA       6.0       Platelets               POC BE               POC HCO3               POC PCO2               POC PH               POC PO2               POC SATURATED O2               POCT Glucose 169 222 178   161     Potassium               PROTEIN TOTAL               Protein, UA       Trace  Comment:  Recommend a 24 hour urine protein or a urine   protein/creatinine ratio if globulin induced proteinuria is  clinically suspected.         RBC               RBC, UA       2       RDW               Sample               Sodium               Specific Gravity, UA       >=1.030       Specimen UA       Urine, Clean Catch       Squam Epithel, UA       3       Troponin I               UROBILINOGEN UA       Negative       WBC, UA       4       WBC                                08/26/19  1755   08/26/19  1743   08/26/19  1736        Albumin   4.4       Alkaline Phosphatase   87       Allens Test N/A         ALT   7       Anion Gap   27       Appearance, UA           AST   9       Bacteria, UA           Baso #   0.03       Basophil%   0.3       Beta-Hydroxybutyrate   6.5       Bilirubin (UA)           BILIRUBIN TOTAL   0.4  Comment:  For infants and newborns, interpretation of results should be based  on gestational age, weight and in agreement with clinical  observations.  Premature Infant recommended reference ranges:  Up to 24 hours.............<8.0 mg/dL  Up to 48  hours............<12.0 mg/dL  3-5 days..................<15.0 mg/dL  6-29 days.................<15.0 mg/dL         BNP   <10  Comment:  Values of less than 100 pg/ml are consistent with non-CHF populations.       Site RB         BUN, Bld   29       Calcium   10.2       Chloride   95       CO2   13       Color, UA           Creatinine   1.9       DelSys Room Air         Differential Method   Automated       eGFR if    34.9       eGFR if non    30.3  Comment:  Calculation used to obtain the estimated glomerular filtration  rate (eGFR) is the CKD-EPI equation.          Eos #   0.0       Eosinophil%   0.1       Glucose   504  Comment:  Glucose critical result(s) called and verbal readback obtained   from Chandan Aden, 08/26/2019 18:17         Glucose, UA           Gran # (ANC)   8.4       Gran%   80.0       Granular Casts, UA           Hematocrit   40.9       Hemoglobin   13.2       Hyaline Casts, UA           Ketones, UA           Leukocytes, UA           Lymph #   1.7       Lymph%   15.6       MCH   25.9       MCHC   32.3       MCV   80       Microscopic Comment           Mode AVAPS         Mono #   0.4       Mono%   4.0       MPV   13.6       NITRITE UA           Occult Blood UA           pH, UA           Platelets   277       POC BE -14         POC HCO3 12.3         POC PCO2 24.1         POC PH 7.316         POC PO2 101         POC SATURATED O2 97         POCT Glucose     444     Potassium   4.6       PROTEIN TOTAL   8.8       Protein, UA           RBC   5.10       RBC, UA           RDW   15.0       Sample ARTERIAL         Sodium   135       Specific [place], UA           Specimen UA           Squam Epithel, UA           Troponin I   <0.006  Comment:  The reference interval for Troponin I represents the 99th percentile   cutoff   for our facility and is consistent with 3rd generation assay   performance.         UROBILINOGEN UA           WBC, UA           WBC   10.56              Significant Imaging:   Imaging Results          X-Ray Chest AP Portable (Final result)  Result time 08/26/19 18:12:04    Final result by DANA Ceballos Sr., MD (08/26/19 18:12:04)                 Impression:      Normal study.      Electronically signed by: Sonu Ceballos MD  Date:    08/26/2019  Time:    18:12             Narrative:    EXAMINATION:  XR CHEST AP PORTABLE    CLINICAL HISTORY:  hyperglycemia;    COMPARISON:  None    FINDINGS:  The size of the heart is normal. The lungs are clear. There is no pneumothorax.  The costophrenic angles are sharp.                                  Assessment/Plan:     * Diabetic ketoacidosis without coma associated with type 2 diabetes mellitus  IV insulin drip will be continued and dose is lowered appropriately.  BMPs will be checked to determine need for replacement pedicle of potassium.  The phosphorus also will be checked along with magnesium.  August for replacement initiated.  Total critical care time spent on the patient excluding any separately billable procedure 33 min      Bacteriuria with pyuria  Repeat UA and stat Rocephin if persistent      Other hyperlipidemia  Will continue with home medications bowel so check CK to rule out statin induced abdominal discomfort      Essential hypertension  Will continue with home medications.      DELMIS (acute kidney injury)  IV hydration should result in improvement.  If not it means there is chronic kidney disease probably secondary to diabetes        VTE Risk Mitigation (From admission, onward)        Ordered     Place STEVAN hose  Until discontinued      08/27/19 0155     IP VTE LOW RISK PATIENT  Once      08/27/19 0155        Critical care time spent on the evaluation and treatment of severe organ dysfunction, review of pertinent labs and imaging studies, discussions with consulting providers and discussions with patient/family: 33 minutes.     Angella Do MD  Department of Hospital Medicine   [facility]

## 2019-08-28 VITALS
DIASTOLIC BLOOD PRESSURE: 73 MMHG | BODY MASS INDEX: 27.12 KG/M2 | OXYGEN SATURATION: 100 % | TEMPERATURE: 99 F | HEIGHT: 59 IN | SYSTOLIC BLOOD PRESSURE: 134 MMHG | HEART RATE: 71 BPM | RESPIRATION RATE: 16 BRPM | WEIGHT: 134.5 LBS

## 2019-08-28 LAB
ANION GAP SERPL CALC-SCNC: 14 MMOL/L (ref 8–16)
BASOPHILS # BLD AUTO: 0.02 K/UL (ref 0–0.2)
BASOPHILS NFR BLD: 0.3 % (ref 0–1.9)
BUN SERPL-MCNC: 6 MG/DL (ref 6–20)
CALCIUM SERPL-MCNC: 8.9 MG/DL (ref 8.7–10.5)
CHLORIDE SERPL-SCNC: 104 MMOL/L (ref 95–110)
CO2 SERPL-SCNC: 18 MMOL/L (ref 23–29)
CREAT SERPL-MCNC: 0.8 MG/DL (ref 0.5–1.4)
DACRYOCYTES BLD QL SMEAR: ABNORMAL
DIFFERENTIAL METHOD: ABNORMAL
EOSINOPHIL # BLD AUTO: 0.1 K/UL (ref 0–0.5)
EOSINOPHIL NFR BLD: 1.9 % (ref 0–8)
ERYTHROCYTE [DISTWIDTH] IN BLOOD BY AUTOMATED COUNT: 14.7 % (ref 11.5–14.5)
EST. GFR  (AFRICAN AMERICAN): >60 ML/MIN/1.73 M^2
EST. GFR  (NON AFRICAN AMERICAN): >60 ML/MIN/1.73 M^2
GLUCOSE SERPL-MCNC: 211 MG/DL (ref 70–110)
HCT VFR BLD AUTO: 36.4 % (ref 37–48.5)
HGB BLD-MCNC: 11.9 G/DL (ref 12–16)
LYMPHOCYTES # BLD AUTO: 2.4 K/UL (ref 1–4.8)
LYMPHOCYTES NFR BLD: 37.1 % (ref 18–48)
MAGNESIUM SERPL-MCNC: 2.3 MG/DL (ref 1.6–2.6)
MCH RBC QN AUTO: 26.4 PG (ref 27–31)
MCHC RBC AUTO-ENTMCNC: 32.7 G/DL (ref 32–36)
MCV RBC AUTO: 81 FL (ref 82–98)
MONOCYTES # BLD AUTO: 0.6 K/UL (ref 0.3–1)
MONOCYTES NFR BLD: 9.5 % (ref 4–15)
NEUTROPHILS # BLD AUTO: 3.3 K/UL (ref 1.8–7.7)
NEUTROPHILS NFR BLD: 51.4 % (ref 38–73)
OVALOCYTES BLD QL SMEAR: ABNORMAL
PHOSPHATE SERPL-MCNC: 3 MG/DL (ref 2.7–4.5)
PLATELET # BLD AUTO: 157 K/UL (ref 150–350)
PLATELET BLD QL SMEAR: ABNORMAL
PMV BLD AUTO: ABNORMAL FL (ref 9.2–12.9)
POCT GLUCOSE: 217 MG/DL (ref 70–110)
POCT GLUCOSE: 372 MG/DL (ref 70–110)
POIKILOCYTOSIS BLD QL SMEAR: SLIGHT
POTASSIUM SERPL-SCNC: 4.1 MMOL/L (ref 3.5–5.1)
RBC # BLD AUTO: 4.51 M/UL (ref 4–5.4)
SODIUM SERPL-SCNC: 136 MMOL/L (ref 136–145)
WBC # BLD AUTO: 6.39 K/UL (ref 3.9–12.7)

## 2019-08-28 PROCEDURE — 25000003 PHARM REV CODE 250: Performed by: INTERNAL MEDICINE

## 2019-08-28 PROCEDURE — 85025 COMPLETE CBC W/AUTO DIFF WBC: CPT

## 2019-08-28 PROCEDURE — 84100 ASSAY OF PHOSPHORUS: CPT

## 2019-08-28 PROCEDURE — 80048 BASIC METABOLIC PNL TOTAL CA: CPT

## 2019-08-28 PROCEDURE — 83735 ASSAY OF MAGNESIUM: CPT

## 2019-08-28 PROCEDURE — 36415 COLL VENOUS BLD VENIPUNCTURE: CPT

## 2019-08-28 RX ORDER — INSULIN LISPRO 100 [IU]/ML
5 INJECTION, SOLUTION INTRAVENOUS; SUBCUTANEOUS
Qty: 3 ML | Refills: 3 | Status: SHIPPED | OUTPATIENT
Start: 2019-08-28 | End: 2020-06-30

## 2019-08-28 RX ORDER — INSULIN GLARGINE 100 [IU]/ML
15 INJECTION, SOLUTION SUBCUTANEOUS NIGHTLY
Qty: 3 ML | Refills: 1 | Status: SHIPPED | OUTPATIENT
Start: 2019-08-28 | End: 2020-04-30 | Stop reason: SDUPTHER

## 2019-08-28 RX ORDER — LANCING DEVICE
1 EACH MISCELLANEOUS 2 TIMES DAILY WITH MEALS
Qty: 1 EACH | Refills: 0 | Status: SHIPPED | OUTPATIENT
Start: 2019-08-28 | End: 2020-08-27

## 2019-08-28 RX ORDER — LANCETS 28 GAUGE
EACH MISCELLANEOUS 2 TIMES DAILY WITH MEALS
Qty: 100 EACH | Refills: 0 | Status: SHIPPED | OUTPATIENT
Start: 2019-08-28 | End: 2020-06-30 | Stop reason: SDUPTHER

## 2019-08-28 RX ORDER — DEXTROSE 4 G
TABLET,CHEWABLE ORAL
Qty: 1 EACH | Refills: 0 | Status: SHIPPED | OUTPATIENT
Start: 2019-08-28

## 2019-08-28 RX ADMIN — FERROUS GLUCONATE TAB 324 MG (37.5 MG ELEMENTAL IRON) 324 MG: 324 (37.5 FE) TAB at 11:08

## 2019-08-28 RX ADMIN — LOSARTAN POTASSIUM 50 MG: 50 TABLET, FILM COATED ORAL at 11:08

## 2019-08-28 RX ADMIN — POLYETHYLENE GLYCOL 3350 17 G: 17 POWDER, FOR SOLUTION ORAL at 11:08

## 2019-08-28 RX ADMIN — POTASSIUM & SODIUM PHOSPHATES POWDER PACK 280-160-250 MG 1 PACKET: 280-160-250 PACK at 11:08

## 2019-08-28 RX ADMIN — CARVEDILOL 6.25 MG: 6.25 TABLET, FILM COATED ORAL at 11:08

## 2019-08-28 RX ADMIN — POTASSIUM & SODIUM PHOSPHATES POWDER PACK 280-160-250 MG 1 PACKET: 280-160-250 PACK at 06:08

## 2019-08-28 RX ADMIN — CHOLECALCIFEROL TAB 125 MCG (5000 UNIT) 5000 UNITS: 125 TAB at 11:08

## 2019-08-28 RX ADMIN — INSULIN ASPART 2 UNITS: 100 INJECTION, SOLUTION INTRAVENOUS; SUBCUTANEOUS at 06:08

## 2019-08-28 RX ADMIN — PANTOPRAZOLE SODIUM 40 MG: 40 TABLET, DELAYED RELEASE ORAL at 11:08

## 2019-08-28 RX ADMIN — INSULIN ASPART 5 UNITS: 100 INJECTION, SOLUTION INTRAVENOUS; SUBCUTANEOUS at 11:08

## 2019-08-28 RX ADMIN — AMLODIPINE BESYLATE 10 MG: 10 TABLET ORAL at 11:08

## 2019-08-28 RX ADMIN — ATORVASTATIN CALCIUM 40 MG: 40 TABLET, FILM COATED ORAL at 11:08

## 2019-08-28 RX ADMIN — ZIPRASIDONE HYDROCHLORIDE 20 MG: 20 CAPSULE ORAL at 11:08

## 2019-08-28 NOTE — NURSING
Went over discharge instructions with patient.   Stressed importance of making and keeping all follow ups as well as making prescribed medication changes.     Prescriptions delivered to pt bedside via [facility] prior to discharge.  Patient verbalized understanding and has no questions in regards to discharge.  IV removed without complications.  Telemetry box removed and returned to monitor tech.  Patient awaiting personal transportation, instructed to call nurse station once ride has arrived.  Primary nurse notified of pt's discharge status.

## 2019-08-28 NOTE — NURSING
Downstairs via wheelchair with nurse tech for discharge. Patient has private transportation home.

## 2019-08-28 NOTE — HOSPITAL COURSE
49 y/o aaf admitted to ICU with a dx of DKA . She was started on DKA protocol . The  Gap closed and the bicarb is > 20 . Pt was  Overlap with long actin insulin .  The Hba1c is > 14 . She will need long acting and short acting insulin .Cont current tx .  8/28 Pt was seen and examined at bedside . She was determined to be suitable for d/c   - She was admitted with a Dx of DKA to ICU  -The Hb a1c was > 14 . She was d/c on long and short acting insulin . Diabetic educator teaching for home insulin use .  -She tolerate  Soft diet w/o any problem  -Before d/c she denies any nausea , abdominal pan or nausea   -She received rocephin x 2 dose  For possible UTI   -She will f/u her PCP

## 2019-08-28 NOTE — PHYSICIAN QUERY
PT Name: Francia Duron  MR #: [id number]    Physician Query Form - Nutrition Clarification     CDS/: Amanda Edwards RN             Contact information: [email or web address]    This form is a permanent document in the medical record.     Query Date: August 28, 2019    By submitting this query, we are merely seeking further clarification of documentation.. Please utilize your independent clinical judgment when addressing the question(s) below.    The Medical record contains the following:   Indicators  Supporting Clinical Findings Location in Medical Record   X % of Estimated Energy Intake over a time frame from p.o., TF, or TPN % Intake of Estimated Energy Needs: Other: diet just advanced  % Meal Intake: Other: diet just advanced RD note 8/27   X Weight Status over a time frame Weight Loss:23.75 % within the last 9 months  RD note 8/27   X Subcutaneous Fat and/or Muscle Loss Orbital Region (Subcutaneous Fat Loss): mild depletion  Upper Arm Region (Subcutaneous Fat Loss): mild depletion   Yarsani Region (Muscle Loss): mild depletion  Clavicle Bone Region (Muscle Loss): mild depletion  Clavicle and Acromion Bone Region (Muscle Loss): mild depletion  Dorsal Hand (Muscle Loss): mild depletion  RD note 8/27   X Fluid Accumulation or Edema Fluid Accumulation: moderate RD note 8/27    Reduced  Strength     X Wt / BMI / Usual Body Weight Weight: 55.6 kg (122 lb 7.5 oz)  BMI (Calculated): 24.8 RD note 8/27    Delayed Wound Healing / Failure to Thrive     X Acute or Chronic Illness Diabetic ketoacidosis without coma   Hx: DM , HLD, HTN RD note 8/27   X Medication Vit D3 tabs 5,000 units po Daily  Ferrous gluconate 324mg PO with breakfast  poyethylene glycol packet 17g po daily  Pantoprazole EC 40mg PO daily  potassium, sodium phosphates 280-160-250 mg packet 1 packet MAR   X Treatment 1. Continue current diet & ONS. 2. RD to f/u.  Intervention: Coordination of care  RD note 8/27   X Other Severe  malnutrition     Malnutrition in the context of Chronic Illness/Injury     Related to (etiology):  Inadequate energy intake      Signs and Symptoms (as evidenced by):  Energy Intake: <50% of estimated energy requirement for > 4 days   Body Fat Depletion: mild depletion of triceps and thoracic and lumbar region   Muscle Mass Depletion: mild depletion of temples, clavicle region, scapular region and interosseous muscle   Weight Loss:23.75 % within the last 9 months   Fluid Accumulation: moderate      AND / ASPEN Clinical Characteristics (October 2011)  A minimum of two characteristics is recommended for diagnosing either moderate or severe malnutrition   Mild Malnutrition Moderate Malnutrition Severe Malnutrition   Energy Intake from p.o., TF or TPN. < 75% intake of estimated energy needs for less than 7 days < 75% intake of estimated energy needs for greater than 7 days < 50% intake of estimated energy needs for > 5 days   Weight Loss 1-2% in 1 month  5% in 3 months  7.5% in 6 months  10% in 1 year 1-2 % in 1 week  5% in 1 month  7.5% in 3 months  10% in 6 months  20% in 1 year > 2% in 1 week  > 5% in 1 month  > 7.5% in 3 months  > 10% in 6 months  > 20% in 1 year   Physical Findings     None *Mild subcutaneous fat and/or muscle loss  *Mild fluid accumulation  *Stage II decubitus  *Surgical wound or non-healing wound *Mod/severe subcutaneous fat and/or muscle loss  *Mod/severe fluid accumulation  *Stage III or IV decubitus  *Non-healing surgical wound     Provider, please specify diagnosis or diagnoses associated with above clinical findings.    [  ] Mild Protein-Calorie Malnutrition   [ x] Moderate Protein-Calorie Malnutrition   [  ] Severe Protein-Calorie Malnutrition   [  ] Other Nutritional Diagnosis (please specify):    [  ] Other:    [  ] Clinically Undetermined       Please document in your progress notes daily for the duration of treatment until resolved and include in your discharge summary.

## 2019-08-28 NOTE — PLAN OF CARE
Problem: Adult Inpatient Plan of Care  Goal: Plan of Care Review  Shift assessment completed.    Patient updated on POC for the day, denies pain, sob.   IVF infusing: IV SL  Neuro: A&O X4  CVR: Continuous telemetry monitoring maintained, patient SR on monitor with HR in the 60s  Resp: pt on rm air  GI: pt able to ambulate to restroom   : pt able to ambulate to restroom   MS: pt has some generalized weakness but able to ambulate to restroom.  Skin: pt has no apparent skin issues.   Reviewed fall precautions with patient.

## 2019-08-28 NOTE — DISCHARGE SUMMARY
[facility] Hospital Medicine  Discharge Summary      Patient Name: Francia Duron  MRN: [id number]  Admission Date: 8/26/2019  Hospital Length of Stay: 1 days  Discharge Date and Time: 8/28/2019 11:42 AM  Attending Physician: No att. providers found   Discharging Provider: Ulises Shane MD  Primary Care Provider: Singh Sanders MD      HPI:   The patient is a 48-year-old female with a past history of type 2 diabetes mellitus, hypertension and hyperlipidemia presented with a three-day history of upset stomach without diarrhea or vomiting or hematochezia.  The patient states she had not been using her insulin because she had not been able to eat and went over to see a doctor yesterday where she was found to have hypoglycemia and sent the emergency department right away.  Initial blood sugars were over 100 and she was given 14 units insulin subcutaneously around 3:00 p.m. yesterday at the primary care physician's office.  Initial blood sugars on arrival at [facility] was still over 500 and she was started on insulin drip.    * No surgery found *      Hospital Course:   47 y/o aaf admitted to ICU with a dx of DKA . She was started on DKA protocol . The  Gap closed and the bicarb is > 20 . Pt was  Overlap with long actin insulin .  The Hba1c is > 14 . She will need long acting and short acting insulin .Cont current tx .  8/28 Pt was seen and examined at bedside . She was determined to be suitable for d/c   - She was admitted with a Dx of DKA to ICU  -The Hb a1c was > 14 . She was d/c on long and short acting insulin . Diabetic educator teaching for home insulin use .  -She tolerate  Soft diet w/o any problem  -Before d/c she denies any nausea , abdominal pan or nausea   -She received rocephin x 2 dose  For possible UTI   -She will f/u her PCP        Consults:   Consults (From admission, onward)        Status Ordering Provider     Inpatient consult to Registered Dietitian/Nutritionist  Once      Provider:  (Not yet assigned)    JUAN DANIEL Calvo.          * Diabetic ketoacidosis without coma associated with type 2 diabetes mellitus  IV insulin drip will be continued and dose is lowered appropriately.  BMPs will be checked to determine need for replacement pedicle of potassium.  The phosphorus also will be checked along with magnesium.  August for replacement initiated.  Total critical care time spent on the patient excluding any separately billable procedure 33 min      Bacteriuria with pyuria  Repeat UA and stat Rocephin if persistent      Other hyperlipidemia  Will continue with home medications bowel so check CK to rule out statin induced abdominal discomfort      Essential hypertension  Will continue with home medications.      DELMIS (acute kidney injury)  IV hydration should result in improvement.  If not it means there is chronic kidney disease probably secondary to diabetes        Final Active Diagnoses:    Diagnosis Date Noted POA    PRINCIPAL PROBLEM:  Diabetic ketoacidosis without coma associated with type 2 diabetes mellitus [E11.10] 08/26/2019 Yes    DELMIS (acute kidney injury) [N17.9] 08/27/2019 Yes    Essential hypertension [I10] 08/27/2019 Yes    Other hyperlipidemia [E78.49] 08/27/2019 Yes    Bacteriuria with pyuria [N39.0] 08/27/2019 Yes    Severe malnutrition [E43] 08/27/2019 Yes      Problems Resolved During this Admission:       Discharged Condition: stable    Disposition: Home or Self Care    Follow Up:  Follow-up Information     Singh Sanders MD In 1 week.    Specialty:  Family Medicine  Contact information:  [address] [facility] MEDICINE  [facility] [address]  [phone]                 Patient Instructions:      Diet diabetic     Notify your health care provider if you experience any of the following:  temperature >100.4     Notify your health care provider if you experience any of the following:  persistent nausea and vomiting or diarrhea     Notify your  health care provider if you experience any of the following:  severe uncontrolled pain     Notify your health care provider if you experience any of the following:  redness, tenderness, or signs of infection (pain, swelling, redness, odor or green/yellow discharge around incision site)     Notify your health care provider if you experience any of the following:  difficulty breathing or increased cough     Notify your health care provider if you experience any of the following:  severe persistent headache     Notify your health care provider if you experience any of the following:  worsening rash     Notify your health care provider if you experience any of the following:  persistent dizziness, light-headedness, or visual disturbances     Notify your health care provider if you experience any of the following:  increased confusion or weakness     Notify your health care provider if you experience any of the following:     Activity as tolerated       Significant Diagnostic Studies: Labs:   BMP:   Recent Labs   Lab 08/27/19  0158 08/27/19  0600 08/27/19  0752 08/27/19  1351 08/28/19  0424   *  187* 218* 236* 341* 211*     139 138 138 134* 136   K 4.2  4.2 4.4 4.0 4.9 4.1     107 110 109 105 104   CO2 17*  17* 20* 18* 21* 18*   BUN 17  17 14 13 9 6   CREATININE 1.1  1.1 1.0 0.9 1.0 0.8   CALCIUM 8.6*  8.6* 8.1* 8.3* 8.4* 8.9   MG 2.1 3.1*  --   --  2.3   , CMP   Recent Labs   Lab 08/26/19  1743  08/27/19  0752 08/27/19  1351 08/28/19  0424   *   < > 138 134* 136   K 4.6   < > 4.0 4.9 4.1   CL 95   < > 109 105 104   CO2 13*   < > 18* 21* 18*   *   < > 236* 341* 211*   BUN 29*   < > 13 9 6   CREATININE 1.9*   < > 0.9 1.0 0.8   CALCIUM 10.2   < > 8.3* 8.4* 8.9   PROT 8.8*  --   --   --   --    ALBUMIN 4.4  --   --   --   --    BILITOT 0.4  --   --   --   --    ALKPHOS 87  --   --   --   --    AST 9*  --   --   --   --    ALT 7*  --   --   --   --    ANIONGAP 27*   < > 11 8 14    ESTGFRAFRICA 34.9*   < > >60 >60 >60   EGFRNONAA 30.3*   < > >60 >60 >60    < > = values in this interval not displayed.   , CBC   Recent Labs   Lab 08/26/19  1743 08/28/19  0424   WBC 10.56 6.39   HGB 13.2 11.9*   HCT 40.9 36.4*    157    and INR No results found for: INR, PROTIME  Microbiology: Blood Culture No results found for: LABBLOO and Urine Culture  No results found for: LABURIN    Pending Diagnostic Studies:     None         Medications:  Reconciled Home Medications:      Medication List      START taking these medications    blood sugar diagnostic Strp  1 strip by Misc.(Non-Drug; Combo Route) route 2 (two) times daily with meals.     blood-glucose meter Misc  use as instructed     HumaLOG KwikPen Insulin 100 unit/mL pen  Generic drug:  insulin lispro  Inject 5 Units into the skin 3 (three) times daily before meals.     lancets 28 gauge Misc  by Misc.(Non-Drug; Combo Route) route 2 (two) times daily with meals.     lancing device Misc  1 Device by Misc.(Non-Drug; Combo Route) route 2 (two) times daily with meals.     LANTUS SOLOSTAR U-100 INSULIN glargine 100 units/mL (3mL) SubQ pen  Generic drug:  insulin  Inject 15 Units into the skin every evening.        CONTINUE taking these medications    amLODIPine 10 MG tablet  Commonly known as:  NORVASC  Take 10 mg by mouth once daily.     atorvastatin 40 MG tablet  Commonly known as:  LIPITOR  Take 40 mg by mouth once daily.     carvedilol 6.25 MG tablet  Commonly known as:  COREG  Take 6.25 mg by mouth 2 (two) times daily with meals.     ferrous gluconate 324 MG tablet  Commonly known as:  FERGON  Take 324 mg by mouth daily with breakfast.     hydroCHLOROthiazide 12.5 MG Tab  Commonly known as:  HYDRODIURIL  Take 12.5 mg by mouth once daily.     polyethylene glycol 17 gram Pwpk  Commonly known as:  GLYCOLAX  Take by mouth.     valsartan 160 MG tablet  Commonly known as:  DIOVAN  Take 160 mg by mouth once daily.     VITAMIN D3 5,000 unit Tab  Generic  drug:  cholecalciferol (vitamin D3)  Take 5,000 Units by mouth once daily.     ziprasidone 60 MG Cap  Commonly known as:  GEODON  Take 20 mg by mouth 2 (two) times daily.        STOP taking these medications    alogliptin 25 mg Tab  Commonly known as:  NESINA     glimepiride 4 MG tablet  Commonly known as:  AMARYL     metFORMIN 500 MG tablet  Commonly known as:  GLUCOPHAGE            Indwelling Lines/Drains at time of discharge:   Lines/Drains/Airways          None          Time spent on the discharge of patient: 35 minutes  Patient was seen and examined on the date of discharge and determined to be suitable for discharge.         Ulises Shane MD  Department of Hospital Medicine  [facility]

## 2019-08-28 NOTE — NURSING
Diabetes follow-up:  Met with patient and sister, Jessica, who lives with patient for diabetes education  Instructed in diabetes care and management to include the importance of diet, exercise, (when allowed) med's and monitoring  Instructed in target glucose values, hyper/hypoglycemia, A1C info  Patient drinks high calorie liquids (2   Twelve ounce regular cokes per day)  Instructed in zero-calorie, diet only   Dicussed low fat and portion control meal planning  Instructed in technique for insulin administration to include  Instructed in technique for insulin administration using the insulin pen to include  1.Attaching pen needle/2 unit air/safety shot  2.Dialing accurate dose  3 Injection technique  4.Site selection/rotation  5 Insulin storage  6.Proper needle disposal  She did well with return demo and does not feel she will have difficulty with self-injections  Provided with daily written schedule to include  3 meals/2 snacks  Pre-meal/pre bedtime glucose testing  Novolog   5 minutes pre-meals  Levemir at bedtime  Discussed other possible insulin which may we prescribed  Literature provided   They verbalize understanding

## 2019-08-28 NOTE — PLAN OF CARE
Problem: Adult Inpatient Plan of Care  Goal: Plan of Care Review  Outcome: Ongoing (interventions implemented as appropriate)  AAO X4. VSS. NSR on monitor. IV saline locked. No complaints at this time.  Fall precautions in place, call bell in reach, bed in low and locked position.   POC discussed w/, verbalized understanding. Will continue to monitor.

## 2019-08-29 NOTE — PHYSICIAN QUERY
PT Name: Francia Duron  MR #: [id number]  Physician Query Form - Renal Condition Clarification     CDS/: Amanda Edwards RN            Contact information: [email or web address]    This form is a permanent document in the medical record.     QueryDate: August 29, 2019    By submitting this query, we are merely seeking further clarification of documentation. Please utilize your independent clinical judgment when addressing the question(s) below.    The Medical record contains the following:   Indicator Supporting Clinical Findings Location in Medical Record    Kidney (Renal) Insufficiency     X Kidney (Renal) Failure / Injury DELMIS (acute kidney injury) H & P    Nephrotoxic Agents     X BUN/Creatinine GFR BUN/Cr  29, 1.9  GFR  34.9  BUN/Cr  6, 0.8   GFR >60   Labs 8/26  Labs 8/28    Urine: Casts         Eosinophils      Dehydration     X Nausea/Vomiting Negative for nausea and vomiting H & P    Dialysis/CRRT     X Treatment: DELMIS (acute kidney injury)  IV hydration should result in improvement.  If not it means there is chronic kidney disease probably secondary to diabetes    DELMIS - Continue volume expansion H & P        Pulmonology consult 8/27    Other:      Acute Kidney Injury / Acute Renal Failure has different defining criteria. A generally accepted guideline  is:   A greater than 100% (2X) rise in serum creatinine from baseline* occurring during the course of a single hospital stay.   *Baseline as determined by the providers judgment and consideration of previous lab values and other documentation, if available.    A diagnosis of Acute Kidney Injury/ Acute Renal Failure should incorporate abnormal labs and clinical findings that are clinically significant      References: 1. Ashwin et al. Acute renal failure-definition, outcome measures, animal models, fluid therapy and information technology needs: the Second International Consensus Conference of the Acute Dialysis Quality Initiative (ADQI) Group. Crit  Care 2004; 8:B204; 2. Raisa et al. Acute Kidney Injury Network: report of an initiative to improve outcomes in acute kidney injury. Crit Care 2007; 11:R31; 3. Kidney Disease: Improving Global Outcomes (KDIGO). Acute Kidney Injury Work Group. KDIGO clinical practice guidelines for acute kidney injury. Kidney Int Suppl 2012; 2:1.    The clinical guidelines noted below is only a system guideline, it does not replace the providers clinical judgment.    Provider, please specify the diagnosis or diagnoses associated with above clinical findings.    [   ] Other Acute Kidney Failure/Injury (please specify): ____________     [   ] Unspecified Acute Kidney Failure/Injury      [   ] Acute Renal Insufficiency  Consider if SCr rise is transient and normalizes quickly with no efforts at real resuscitation of vital signs and perfusion   [   ] Chronic Kidney Disease (CKD) stage 1   Slight kidney damage with normal or increased filtration eGFR 90+   [   ] Chronic Kidney Disease (CKD) stage 2   Mildly reduced kidney function eGFR 60-89   [   ] Other (please specify): _________________________________   [   ]  Clinically Undetermined       Please document in your progress notes daily for the duration of treatment until resolved and include in your discharge summary.      Pt care was transferred to Dr. Disa.  Stoney Downing

## 2019-08-30 ENCOUNTER — PATIENT OUTREACH (OUTPATIENT)
Dept: ADMINISTRATIVE | Facility: CLINIC | Age: 49
End: 2019-08-30

## 2019-08-30 NOTE — PATIENT INSTRUCTIONS
Diabetes and Heart Disease     Take your medicines as directed each day, even if you feel fine.   If you have diabetes, you are two to four times more likely to have heart disease than someone without diabetes. This higher risk is due to diabetes, but it is also due to other risk factors for heart disease that happen in people with diabetes. But theres good news. You can help control your health risks by making some changes in your life. You can take steps to reduce your risk of heart disease by half--similar to the risk in people who don't have diabetes.  Your main risk factors  Three major risk factors for heart disease are high blood sugar, high blood pressure, and high levels of lipids. By keeping risk factors under control, you can help keep your heart and arteries healthy. This may reduce your chances of a heart attack.  · Blood sugar. High blood sugar can make artery walls tough and rough. Plaque (waxy material in the blood) can then build up along the artery walls, making it harder for blood to flow through the arteries. Having high blood sugar increases the chances of having high blood pressure and high cholesterol.  · Blood pressure. When blood pressure is high all the time it causes your heart to work harder to pump blood. Artery walls become damaged. This increases the risk for plaque build up.  · Lipids. The body needs some lipids in the blood to stay healthy. But lipid levels that are too high can damage the artery walls. Lipids include cholesterol and triglycerides. There are two kinds of cholesterol. LDL (bad) cholesterol can damage the arteries. But HDL (good) cholesterol helps clear LDL cholesterol from the blood vessels. This helps keep the arteries healthy. When blood sugar is high, the level of triglycerides in the blood may also be high. High blood triglyceride levels can cause plaque to form.   Other risk factors  Certain lifestyle factors can increase levels of your blood sugar, blood  pressure, and lipids. Such increases raise your risk of heart disease:  · Smoking damages the lining of your arteries. This allows plaque to build up in the artery walls. Smoking also constricts (narrows) the arteries. This can raise blood pressure and cause chest pain or angina. Smoking also increases your risk of getting type 2 diabetes.  · Not being active makes it harder for your heart to do its work. Inactivity is linked to many other risk factors, such as high blood pressure and poor cholesterol levels. Inactivity also increases your risk of getting type 2 diabetes.  · Being overweight makes it harder for your body to use insulin. It also makes your heart work too hard. Being overweight is also the main contributor to the development of type 2 diabetes,   Changes you can make  Following a few simple steps can help keep your risk factors under control. Work with your healthcare team to reach your goals.  · Quitting smoking could save your life. Smoking damages the lining of the blood vessels and raises blood pressure. Smoking also affects how your body uses insulin. This makes it harder to keep blood sugar under control. If you smoke and need help quitting, talk to your healthcare team.   · Testing your blood sugar is the only way to know whether it is under control. Be sure to test your blood sugar yourself. Also get your blood tested in the lab, as directed.  · Monitoring your blood pressure and lipid levels can help you achieve safe levels. Visit your healthcare team as scheduled.  · Taking medicines as directed can help control blood sugar, blood pressure, blood clotting, and/or cholesterol levels.  · Eating right can reduce your risk factors and help you lose weight. Try to limit the amount of processed or refined carbohydrates you eat at one time. Cut back on your total calorie intake. Eat foods low in saturated fat and cholesterol. Eat fiber, including vegetables and whole grains, and cut down on salt. A  dietitian or diabetes educator can help form a meal plan that works for you--even if you are on a low budget.   · Being active can help reduce your weight, strengthen your heart, and lower your lipid levels and blood pressure. Exercise and activity are good for your whole body. Talk to your healthcare team about increasing your activity safely over time.  · Keeping your appointments with your healthcare provider helps you stay healthy. Go in for checkups and lab tests as scheduled.  Date Last Reviewed: 5/19/2016  © 1529-7827 [employer]. [address] [place] [address], [place] [address]. All rights reserved. This information is not intended as a substitute for professional medical care. Always follow your healthcare professional's instructions.

## 2019-09-03 NOTE — PHYSICIAN QUERY
PT Name: Francia Duron  MR #: [id number]  Physician Query Form - Renal Condition Clarification     CDS/: Amanda Edwards RN             Contact information: [email or web address]    This form is a permanent document in the medical record.     QueryDate: September 3, 2019    By submitting this query, we are merely seeking further clarification of documentation. Please utilize your independent clinical judgment when addressing the question(s) below.    The Medical record contains the following:    Indicator Supporting Clinical Findings Location in Medical Record     Kidney (Renal) Insufficiency       X Kidney (Renal) Failure / Injury [facility] (acute kidney injury) [facility]     Nephrotoxic Agents       X BUN/Creatinine GFR BUN/Cr  29, 1.9  GFR  34.9  BUN/Cr  6, 0.8   GFR >60    Labs 8/26  Labs 8/28     Urine: Casts         Eosinophils         Dehydration       X Nausea/Vomiting Negative for nausea and vomiting [facility]     Dialysis/CRRT       X Treatment: DELMIS (acute kidney injury)  IV hydration should result in improvement.  If not it means there is chronic kidney disease probably secondary to diabetes     DELMIS - Continue volume expansion [facility]           Pulmonology consult 8/27     Other:                   Acute Kidney Injury / Acute Renal Failure has different defining criteria. A generally accepted guideline  is:   A greater than 100% (2X) rise in serum creatinine from baseline* occurring during the course of a single hospital stay.   *Baseline as determined by the providers judgment and consideration of previous lab values and other documentation, if available.     A diagnosis of Acute Kidney Injury/ Acute Renal Failure should incorporate abnormal labs and clinical findings that are clinically significant       References: 1. Ashwin et al. Acute renal failure-definition, outcome measures, animal models, fluid therapy and information technology needs: the Second International Consensus Conference of the Acute  Dialysis Quality Initiative (ADQI) Group. Crit Care 2004; 8:B204; 2. Raisa et al. Acute Kidney Injury Network: report of an initiative to improve outcomes in acute kidney injury. Crit Care 2007; 11:R31; 3. Kidney Disease: Improving Global Outcomes (KDIGO). Acute Kidney Injury Work Group. KDIGO clinical practice guidelines for acute kidney injury. Kidney Int Suppl 2012; 2:1.     The clinical guidelines noted below is only a system guideline, it does not replace the providers clinical judgment.     Provider, please specify the diagnosis or diagnoses associated with above clinical findings.     [   ] Other Acute Kidney Failure/Injury (please specify): ____________     [   ] Unspecified Acute Kidney Failure/Injury      [ x  ] Acute Renal Insufficiency  Consider if SCr rise is transient and normalizes quickly with no efforts at real resuscitation of vital signs and perfusion   [   ] Chronic Kidney Disease (CKD) stage 1   Slight kidney damage with normal or increased filtration eGFR 90+   [   ] Chronic Kidney Disease (CKD) stage 2   Mildly reduced kidney function eGFR 60-89   [   ] Other (please specify): _________________________________   [   ]  Clinically Undetermined        Please document in your progress notes daily for the duration of treatment until resolved and include in your discharge summary.

## 2020-02-20 PROBLEM — F20.9 SCHIZOPHRENIA: Status: ACTIVE | Noted: 2020-02-20

## 2020-06-30 PROBLEM — E11.10 DIABETIC KETOACIDOSIS WITHOUT COMA ASSOCIATED WITH TYPE 2 DIABETES MELLITUS: Status: RESOLVED | Noted: 2019-08-26 | Resolved: 2020-06-30

## 2020-08-22 ENCOUNTER — TELEPHONE (OUTPATIENT)
Dept: ENDOSCOPY | Facility: HOSPITAL | Age: 50
End: 2020-08-22

## 2021-07-07 ENCOUNTER — HOSPITAL ENCOUNTER (OUTPATIENT)
Dept: RADIOLOGY | Facility: HOSPITAL | Age: 51
Discharge: HOME | End: 2021-07-07
Attending: NURSE PRACTITIONER
Payer: MEDICAID

## 2021-07-07 VITALS — WEIGHT: 154.56 LBS | HEIGHT: 61 IN | BODY MASS INDEX: 29.18 KG/M2

## 2021-07-07 DIAGNOSIS — Z12.31 BREAST CANCER SCREENING BY MAMMOGRAM: ICD-10-CM

## 2021-07-07 PROCEDURE — 77063 BREAST TOMOSYNTHESIS BI: CPT | Mod: 26,,, | Performed by: RADIOLOGY

## 2021-07-07 PROCEDURE — 77067 SCR MAMMO BI INCL CAD: CPT | Mod: TC,PO

## 2021-07-07 PROCEDURE — 77067 MAMMO DIGITAL SCREENING BILAT WITH TOMO: ICD-10-PCS | Mod: 26,,, | Performed by: RADIOLOGY

## 2021-07-07 PROCEDURE — 77067 SCR MAMMO BI INCL CAD: CPT | Mod: 26,,, | Performed by: RADIOLOGY

## 2021-07-07 PROCEDURE — 77063 MAMMO DIGITAL SCREENING BILAT WITH TOMO: ICD-10-PCS | Mod: 26,,, | Performed by: RADIOLOGY
